# Patient Record
Sex: FEMALE | Race: WHITE | Employment: FULL TIME | URBAN - NONMETROPOLITAN AREA
[De-identification: names, ages, dates, MRNs, and addresses within clinical notes are randomized per-mention and may not be internally consistent; named-entity substitution may affect disease eponyms.]

---

## 2017-01-24 ENCOUNTER — OFFICE VISIT (OUTPATIENT)
Dept: UROLOGY | Age: 37
End: 2017-01-24

## 2017-01-24 VITALS
SYSTOLIC BLOOD PRESSURE: 122 MMHG | BODY MASS INDEX: 35.84 KG/M2 | HEIGHT: 71 IN | WEIGHT: 256 LBS | DIASTOLIC BLOOD PRESSURE: 70 MMHG

## 2017-01-24 DIAGNOSIS — E27.8 ADRENAL NODULE (HCC): ICD-10-CM

## 2017-01-24 DIAGNOSIS — N20.0 KIDNEY STONE: Primary | ICD-10-CM

## 2017-01-24 LAB
BILIRUBIN URINE: NEGATIVE
BLOOD URINE, POC: NORMAL
CHARACTER, URINE: CLEAR
COLOR, URINE: YELLOW
GLUCOSE URINE: NEGATIVE MG/DL
KETONES, URINE: NEGATIVE
LEUKOCYTE CLUMPS, URINE: NEGATIVE
NITRITE, URINE: NEGATIVE
PH, URINE: 5.5
PROTEIN, URINE: NEGATIVE MG/DL
SPECIFIC GRAVITY, URINE: 1.02 (ref 1–1.03)
UROBILINOGEN, URINE: 0.2 EU/DL

## 2017-01-24 PROCEDURE — 99213 OFFICE O/P EST LOW 20 MIN: CPT | Performed by: UROLOGY

## 2017-01-24 PROCEDURE — 81003 URINALYSIS AUTO W/O SCOPE: CPT | Performed by: UROLOGY

## 2017-12-18 ENCOUNTER — TELEPHONE (OUTPATIENT)
Dept: UROLOGY | Age: 37
End: 2017-12-18

## 2017-12-18 DIAGNOSIS — N20.0 KIDNEY STONE: Primary | ICD-10-CM

## 2018-01-30 ENCOUNTER — HOSPITAL ENCOUNTER (OUTPATIENT)
Age: 38
Discharge: HOME | End: 2018-01-30
Payer: COMMERCIAL

## 2018-01-30 ENCOUNTER — TELEPHONE (OUTPATIENT)
Dept: UROLOGY | Age: 38
End: 2018-01-30

## 2018-01-30 ENCOUNTER — HOSPITAL ENCOUNTER (OUTPATIENT)
Dept: GENERAL RADIOLOGY | Age: 38
Discharge: HOME | End: 2018-01-30
Payer: COMMERCIAL

## 2018-01-30 DIAGNOSIS — N20.0 KIDNEY STONE: Primary | ICD-10-CM

## 2018-01-30 DIAGNOSIS — N20.0 KIDNEY STONE: ICD-10-CM

## 2018-01-30 PROCEDURE — 74018 RADEX ABDOMEN 1 VIEW: CPT

## 2018-01-31 ENCOUNTER — OFFICE VISIT (OUTPATIENT)
Dept: UROLOGY | Age: 38
End: 2018-01-31
Payer: COMMERCIAL

## 2018-01-31 VITALS
HEIGHT: 71 IN | WEIGHT: 255 LBS | SYSTOLIC BLOOD PRESSURE: 132 MMHG | DIASTOLIC BLOOD PRESSURE: 70 MMHG | BODY MASS INDEX: 35.7 KG/M2

## 2018-01-31 DIAGNOSIS — N20.0 KIDNEY STONE: Primary | ICD-10-CM

## 2018-01-31 LAB
BILIRUBIN URINE: NEGATIVE
BLOOD URINE, POC: NORMAL
CHARACTER, URINE: CLEAR
COLOR, URINE: YELLOW
GLUCOSE URINE: NEGATIVE MG/DL
KETONES, URINE: NEGATIVE
LEUKOCYTE CLUMPS, URINE: NEGATIVE
NITRITE, URINE: NEGATIVE
PH, URINE: 5
PROTEIN, URINE: NEGATIVE MG/DL
SPECIFIC GRAVITY, URINE: >= 1.03 (ref 1–1.03)
UROBILINOGEN, URINE: 0.2 EU/DL

## 2018-01-31 PROCEDURE — 99213 OFFICE O/P EST LOW 20 MIN: CPT | Performed by: NURSE PRACTITIONER

## 2018-01-31 PROCEDURE — 81003 URINALYSIS AUTO W/O SCOPE: CPT | Performed by: NURSE PRACTITIONER

## 2018-01-31 ASSESSMENT — ENCOUNTER SYMPTOMS
ABDOMINAL PAIN: 0
VOMITING: 0
NAUSEA: 0

## 2018-08-08 ENCOUNTER — TELEPHONE (OUTPATIENT)
Dept: UROLOGY | Age: 38
End: 2018-08-08

## 2018-09-05 ENCOUNTER — TELEPHONE (OUTPATIENT)
Dept: UROLOGY | Age: 38
End: 2018-09-05

## 2018-09-05 NOTE — TELEPHONE ENCOUNTER
Please call patient and let her know that the 24 hour urine study is back. Urine output was good, continue water intake. Calcium is borderline high. Monitor calcium intake, we suggest 800-1200 mg daily. Sodium was high. Start low salt diet, we suggest 1578-5450 mg daily. The oxalate was high and they should start a low oxalate diet. Please mail them a diet sheet. Previous stones were calcium oxalate.

## 2018-09-10 NOTE — TELEPHONE ENCOUNTER
Tried to contact patient and was unable to reach her. Left message for patient to call the office back.

## 2018-09-11 NOTE — TELEPHONE ENCOUNTER
Tried to contact patient to review [facility] results per Ty. Left a voicemail asking for a call back to the office.

## 2019-06-06 DIAGNOSIS — N20.0 KIDNEY STONE: Primary | ICD-10-CM

## 2019-06-11 ENCOUNTER — HOSPITAL ENCOUNTER (OUTPATIENT)
Age: 39
Discharge: HOME | End: 2019-06-11
Payer: COMMERCIAL

## 2019-06-11 ENCOUNTER — HOSPITAL ENCOUNTER (OUTPATIENT)
Dept: GENERAL RADIOLOGY | Age: 39
Discharge: HOME | End: 2019-06-11
Payer: COMMERCIAL

## 2019-06-11 DIAGNOSIS — N20.0 KIDNEY STONE: ICD-10-CM

## 2019-06-11 PROCEDURE — 74018 RADEX ABDOMEN 1 VIEW: CPT

## 2019-06-12 ENCOUNTER — OFFICE VISIT (OUTPATIENT)
Dept: UROLOGY | Age: 39
End: 2019-06-12
Payer: COMMERCIAL

## 2019-06-12 VITALS
WEIGHT: 266 LBS | BODY MASS INDEX: 36.03 KG/M2 | HEIGHT: 72 IN | DIASTOLIC BLOOD PRESSURE: 84 MMHG | SYSTOLIC BLOOD PRESSURE: 122 MMHG

## 2019-06-12 DIAGNOSIS — D35.01 ADENOMA OF RIGHT ADRENAL GLAND: ICD-10-CM

## 2019-06-12 DIAGNOSIS — N20.0 NEPHROLITHIASIS: Primary | ICD-10-CM

## 2019-06-12 LAB
BILIRUBIN URINE: NEGATIVE
BLOOD URINE, POC: ABNORMAL
CHARACTER, URINE: CLEAR
COLOR, URINE: YELLOW
GLUCOSE URINE: NEGATIVE MG/DL
KETONES, URINE: NEGATIVE
LEUKOCYTE CLUMPS, URINE: NEGATIVE
NITRITE, URINE: NEGATIVE
PH, URINE: 5.5 (ref 5–9)
PROTEIN, URINE: NEGATIVE MG/DL
SPECIFIC GRAVITY, URINE: >= 1.03 (ref 1–1.03)
UROBILINOGEN, URINE: 0.2 EU/DL (ref 0–1)

## 2019-06-12 PROCEDURE — 81003 URINALYSIS AUTO W/O SCOPE: CPT | Performed by: NURSE PRACTITIONER

## 2019-06-12 PROCEDURE — 99213 OFFICE O/P EST LOW 20 MIN: CPT | Performed by: NURSE PRACTITIONER

## 2019-06-12 RX ORDER — LORATADINE 10 MG/1
10 TABLET, ORALLY DISINTEGRATING ORAL DAILY
COMMUNITY
End: 2021-05-19 | Stop reason: ALTCHOICE

## 2019-06-12 RX ORDER — FLUTICASONE PROPIONATE 50 MCG
1 SPRAY, SUSPENSION (ML) NASAL DAILY
COMMUNITY

## 2019-06-12 RX ORDER — CITALOPRAM 20 MG/1
20 TABLET ORAL DAILY
COMMUNITY
End: 2021-05-19

## 2019-06-12 ASSESSMENT — ENCOUNTER SYMPTOMS
ABDOMINAL PAIN: 0
NAUSEA: 0
VOMITING: 0

## 2019-06-12 NOTE — PROGRESS NOTES
Subjective:      Patient ID: Napoleon Ramos is a 45 y.o. female. KIRBY Richmond is here for follow-up of kidney stones. She underwent left ESWL in June 2016 and again in July 2016 for large left renal stone. She passed several stone fragments following surgery. She is currently asymptomatic. She is here to review her Xray KUB. She also has a right adrenal nodule which is consistent with adrenal adenoma. Review of Systems   Constitutional: Negative for chills, fatigue and fever. Gastrointestinal: Negative for abdominal pain, nausea and vomiting. Genitourinary: Negative for dysuria, flank pain, frequency, hematuria and urgency. Objective:   Physical Exam   Constitutional: She is oriented to person, place, and time. She appears well-developed and well-nourished. HENT:   Head: Normocephalic and atraumatic. Right Ear: External ear normal.   Left Ear: External ear normal.   Nose: Nose normal.   Eyes: Conjunctivae are normal.   Pulmonary/Chest: Effort normal.   Abdominal: Soft. Musculoskeletal: Normal range of motion. Neurological: She is alert and oriented to person, place, and time. Skin: Skin is warm and dry. Psychiatric: She has a normal mood and affect. Her behavior is normal. Judgment and thought content normal.         Assessment:      Left renal Calculi--stable. Right adrenal adenoma      Plan:      Patient given order for 24 hour urine study. We will call with results. Recent CT abd/pelvis shows right adrenal nodule is consistent with adenoma. Follow-up in 1 year with KUB prior.

## 2019-07-09 ENCOUNTER — TELEPHONE (OUTPATIENT)
Dept: UROLOGY | Age: 39
End: 2019-07-09

## 2019-07-09 ENCOUNTER — HOSPITAL ENCOUNTER (OUTPATIENT)
Dept: CT IMAGING | Age: 39
Discharge: HOME | End: 2019-07-09
Payer: COMMERCIAL

## 2019-07-09 DIAGNOSIS — Z00.6 ENCOUNTER FOR EXAMINATION FOR NORMAL COMPARISON AND CONTROL IN CLINICAL RESEARCH PROGRAM: ICD-10-CM

## 2019-07-09 PROCEDURE — 3209999900 CT COMPARISON OF OUTSIDE FILMS

## 2019-07-18 ASSESSMENT — ENCOUNTER SYMPTOMS
VOMITING: 0
ABDOMINAL PAIN: 0
NAUSEA: 0

## 2019-07-18 NOTE — PROGRESS NOTES
Subjective:      Patient ID: Rosetta Kaba is a 45 y.o. female. KIRBY Fang is here for follow-up of kidney stones, right adrenal adenoma, and recent ED visit. She recently presented to [facility] ED on 7/8/19 due to acute onset of severe left flank pain which radiated down to her bladder. She reports the pain has improved but is still noticeable. CT abd/pelvis showed a left renal stone and persistent left hydronephrosis. She underwent left ESWL in June 2016 and again in July 2016 for large left renal stone. She passed several stone fragments following surgery. She also has a right adrenal nodule which is consistent with adrenal adenoma. Review of Systems   Constitutional: Negative for chills, fatigue and fever. Gastrointestinal: Negative for abdominal pain, nausea and vomiting. Genitourinary: Negative for dysuria, flank pain, frequency, hematuria and urgency. Objective:   Physical Exam   Constitutional: She is oriented to person, place, and time. She appears well-developed and well-nourished. HENT:   Head: Normocephalic and atraumatic. Right Ear: External ear normal.   Left Ear: External ear normal.   Nose: Nose normal.   Eyes: Conjunctivae are normal.   Pulmonary/Chest: Effort normal.   Abdominal: Soft. Musculoskeletal: Normal range of motion. Neurological: She is alert and oriented to person, place, and time. Skin: Skin is warm and dry. Psychiatric: She has a normal mood and affect.  Her behavior is normal. Judgment and thought content normal.     Results for POC orders placed in visit on 07/19/19   POCT Urinalysis No Micro (Auto)   Result Value Ref Range    Glucose, Ur Negative NEGATIVE mg/dl    Bilirubin Urine Negative     Ketones, Urine Negative NEGATIVE    Specific Gravity, Urine >= 1.030 1.002 - 1.03    Blood, UA POC Negative NEGATIVE    pH, Urine 5.50 5.0 - 9.0    Protein, Urine Negative NEGATIVE mg/dl    Urobilinogen, Urine 0.20 0.0 - 1.0 eu/dl    Nitrite, Urine Negative

## 2019-07-19 ENCOUNTER — OFFICE VISIT (OUTPATIENT)
Dept: UROLOGY | Age: 39
End: 2019-07-19
Payer: COMMERCIAL

## 2019-07-19 VITALS
WEIGHT: 276 LBS | BODY MASS INDEX: 37.38 KG/M2 | DIASTOLIC BLOOD PRESSURE: 86 MMHG | SYSTOLIC BLOOD PRESSURE: 110 MMHG | HEIGHT: 72 IN

## 2019-07-19 DIAGNOSIS — N13.39 OTHER HYDRONEPHROSIS: ICD-10-CM

## 2019-07-19 DIAGNOSIS — N20.0 KIDNEY STONE: ICD-10-CM

## 2019-07-19 DIAGNOSIS — N39.0 RECURRENT UTI: Primary | ICD-10-CM

## 2019-07-19 LAB
BILIRUBIN URINE: NEGATIVE
BLOOD URINE, POC: NEGATIVE
CHARACTER, URINE: CLEAR
COLOR, URINE: YELLOW
GLUCOSE URINE: NEGATIVE MG/DL
KETONES, URINE: NEGATIVE
LEUKOCYTE CLUMPS, URINE: NEGATIVE
NITRITE, URINE: NEGATIVE
PH, URINE: 5.5 (ref 5–9)
PROTEIN, URINE: NEGATIVE MG/DL
SPECIFIC GRAVITY, URINE: >= 1.03 (ref 1–1.03)
UROBILINOGEN, URINE: 0.2 EU/DL (ref 0–1)

## 2019-07-19 PROCEDURE — 81003 URINALYSIS AUTO W/O SCOPE: CPT | Performed by: NURSE PRACTITIONER

## 2019-07-19 PROCEDURE — 99214 OFFICE O/P EST MOD 30 MIN: CPT | Performed by: NURSE PRACTITIONER

## 2019-07-19 RX ORDER — SULFAMETHOXAZOLE AND TRIMETHOPRIM 800; 160 MG/1; MG/1
TABLET ORAL
Refills: 0 | COMMUNITY
Start: 2019-07-14 | End: 2021-05-19

## 2019-07-29 DIAGNOSIS — N20.0 KIDNEY STONE: ICD-10-CM

## 2019-07-29 DIAGNOSIS — N39.0 RECURRENT UTI: ICD-10-CM

## 2019-07-29 DIAGNOSIS — N13.39 OTHER HYDRONEPHROSIS: Primary | ICD-10-CM

## 2019-07-30 ENCOUNTER — TELEPHONE (OUTPATIENT)
Dept: UROLOGY | Age: 39
End: 2019-07-30

## 2019-07-30 ENCOUNTER — HOSPITAL ENCOUNTER (OUTPATIENT)
Dept: NUCLEAR MEDICINE | Age: 39
Discharge: HOME | End: 2019-07-30
Payer: COMMERCIAL

## 2019-07-30 DIAGNOSIS — N20.0 KIDNEY STONE: ICD-10-CM

## 2019-07-30 DIAGNOSIS — N13.39 OTHER HYDRONEPHROSIS: ICD-10-CM

## 2019-07-30 DIAGNOSIS — N39.0 RECURRENT UTI: ICD-10-CM

## 2019-07-30 LAB — POC CREATININE WHOLE BLOOD: 0.6 MG/DL (ref 0.5–1.2)

## 2019-07-30 PROCEDURE — 82565 ASSAY OF CREATININE: CPT

## 2019-07-30 PROCEDURE — 6360000002 HC RX W HCPCS

## 2019-07-30 PROCEDURE — 78708 K FLOW/FUNCT IMAGE W/DRUG: CPT

## 2019-07-30 PROCEDURE — 2709999900 HC NON-CHARGEABLE SUPPLY

## 2019-07-30 PROCEDURE — 3430000000 HC RX DIAGNOSTIC RADIOPHARMACEUTICAL: Performed by: NURSE PRACTITIONER

## 2019-07-30 PROCEDURE — A9562 TC99M MERTIATIDE: HCPCS | Performed by: NURSE PRACTITIONER

## 2019-07-30 RX ORDER — FUROSEMIDE 10 MG/ML
40 INJECTION INTRAMUSCULAR; INTRAVENOUS ONCE
Status: COMPLETED | OUTPATIENT
Start: 2019-07-30 | End: 2019-07-30

## 2019-07-30 RX ADMIN — FUROSEMIDE 40 MG: 10 INJECTION INTRAMUSCULAR; INTRAVENOUS at 07:38

## 2019-07-30 RX ADMIN — Medication 10 MILLICURIE: at 07:28

## 2021-05-11 DIAGNOSIS — N20.0 KIDNEY STONE: Primary | ICD-10-CM

## 2021-05-17 ENCOUNTER — HOSPITAL ENCOUNTER (OUTPATIENT)
Dept: GENERAL RADIOLOGY | Age: 41
Discharge: HOME | End: 2021-05-17
Payer: COMMERCIAL

## 2021-05-17 ENCOUNTER — HOSPITAL ENCOUNTER (OUTPATIENT)
Age: 41
Discharge: HOME | End: 2021-05-17
Payer: COMMERCIAL

## 2021-05-17 DIAGNOSIS — N20.0 KIDNEY STONE: ICD-10-CM

## 2021-05-17 PROCEDURE — 74018 RADEX ABDOMEN 1 VIEW: CPT

## 2021-05-19 ENCOUNTER — OFFICE VISIT (OUTPATIENT)
Dept: UROLOGY | Age: 41
End: 2021-05-19
Payer: COMMERCIAL

## 2021-05-19 VITALS — WEIGHT: 257 LBS | BODY MASS INDEX: 34.81 KG/M2 | HEIGHT: 72 IN

## 2021-05-19 DIAGNOSIS — R31.29 MICROHEMATURIA: ICD-10-CM

## 2021-05-19 DIAGNOSIS — N20.0 KIDNEY STONE: Primary | ICD-10-CM

## 2021-05-19 LAB
BACTERIA: ABNORMAL
BILIRUBIN URINE: NEGATIVE
BILIRUBIN URINE: NEGATIVE
BLOOD URINE, POC: ABNORMAL
BLOOD, URINE: ABNORMAL
CASTS: ABNORMAL /LPF
CASTS: ABNORMAL /LPF
CHARACTER, URINE: CLEAR
CHARACTER, URINE: CLEAR
COLOR, URINE: YELLOW
COLOR: YELLOW
CRYSTALS: ABNORMAL
EPITHELIAL CELLS, UA: ABNORMAL /HPF
GLUCOSE URINE: NEGATIVE MG/DL
GLUCOSE, URINE: NEGATIVE MG/DL
KETONES, URINE: NEGATIVE
KETONES, URINE: NEGATIVE
LEUKOCYTE CLUMPS, URINE: NEGATIVE
LEUKOCYTE ESTERASE, URINE: NEGATIVE
MISCELLANEOUS LAB TEST RESULT: ABNORMAL
NITRITE, URINE: NEGATIVE
NITRITE, URINE: NEGATIVE
PH UA: 5 (ref 5–9)
PH, URINE: 5.5 (ref 5–9)
PROTEIN UA: ABNORMAL MG/DL
PROTEIN, URINE: ABNORMAL MG/DL
RBC URINE: ABNORMAL /HPF
RENAL EPITHELIAL, UA: ABNORMAL
SPECIFIC GRAVITY UA: 1.02 (ref 1–1.03)
SPECIFIC GRAVITY, URINE: 1.02 (ref 1–1.03)
UROBILINOGEN, URINE: 0.2 EU/DL (ref 0–1)
UROBILINOGEN, URINE: 0.2 EU/DL (ref 0–1)
WBC UA: ABNORMAL /HPF
YEAST: ABNORMAL

## 2021-05-19 PROCEDURE — 81003 URINALYSIS AUTO W/O SCOPE: CPT | Performed by: NURSE PRACTITIONER

## 2021-05-19 PROCEDURE — 99214 OFFICE O/P EST MOD 30 MIN: CPT | Performed by: NURSE PRACTITIONER

## 2021-05-19 RX ORDER — LISINOPRIL 20 MG/1
20 TABLET ORAL DAILY
COMMUNITY
End: 2022-02-25 | Stop reason: SDUPTHER

## 2021-05-19 RX ORDER — CYCLOBENZAPRINE HCL 10 MG
10 TABLET ORAL NIGHTLY PRN
COMMUNITY
End: 2021-12-14

## 2021-05-19 ASSESSMENT — ENCOUNTER SYMPTOMS
ABDOMINAL PAIN: 0
NAUSEA: 0
VOMITING: 0

## 2021-05-19 NOTE — PROGRESS NOTES
Subjective:      Patient ID: Arnetta Apgar is a 36 y.o. female. HPI      Zandra Madrid is here for follow-up of kidney stones, right adrenal adenoma. Patient previously seen by Kalie Rainey, and Dr. Ramon Hartley. Last seen 2 years ago. KUB shows left renal stones. Reports intermittent lower back pain for last few months. Treated for UTI a few months ago. No symptoms today. No gross hematuria  + hx of tobacco use 21 years. CT in 2019 shows left hydronephrosis, follow up renal lasix scan showed no high grade obstruction. In the past she underwent left ESWL in 2016 and again in 2016 for large left renal stone. She passed several stone fragments following surgery. She also has a right adrenal nodule which is consistent with adrenal adenoma. Review of Systems   Constitutional: Negative for chills, fatigue and fever. Gastrointestinal: Negative for abdominal pain, nausea and vomiting. Genitourinary: Negative for dysuria, flank pain, frequency, hematuria and urgency. Objective:   Physical Exam  Constitutional:       Appearance: She is well-developed. HENT:      Head: Normocephalic and atraumatic. Right Ear: External ear normal.      Left Ear: External ear normal.      Nose: Nose normal.   Eyes:      Conjunctiva/sclera: Conjunctivae normal.   Pulmonary:      Effort: Pulmonary effort is normal.   Abdominal:      Palpations: Abdomen is soft. Musculoskeletal:         General: Normal range of motion. Skin:     General: Skin is warm and dry. Neurological:      Mental Status: She is alert and oriented to person, place, and time. Psychiatric:         Behavior: Behavior normal.         Thought Content:  Thought content normal.         Judgment: Judgment normal.       Results for POC orders placed in visit on 21   POCT Urinalysis No Micro (Auto)   Result Value Ref Range    Glucose, Ur Negative NEGATIVE mg/dl    Bilirubin Urine Negative     Ketones, Urine Negative NEGATIVE Specific Gravity, Urine 1.025 1.002 - 1.030    Blood, UA POC Large (A) NEGATIVE    pH, Urine 5.50 5.0 - 9.0    Protein, Urine Trace (A) NEGATIVE mg/dl    Urobilinogen, Urine 0.20 0.0 - 1.0 eu/dl    Nitrite, Urine Negative NEGATIVE    Leukocyte Clumps, Urine Negative NEGATIVE    Color, Urine Yellow YELLOW-STRAW    Character, Urine Clear CLR-SL.CLOUD       KUB:       TECHNIQUE:  AP supine abdomen 2 views        FINDINGS:        There is a cluster of rounded calcified densities overlying the lower pole the right kidney. In aggregate, these measure approximately 1.4 x 1 cm. The largest of these calcified densities measures approximately 7 x 6 mm in dimension. These overlie the    expected location of the left lower pole renal shadow.       There is no evidence of bowel obstruction. Surgical clips are seen overlying the right upper quadrant from prior cholecystectomy. There are calcified phleboliths overlying the pelvis.           Impression   1. There is a cluster of several rounded calcified densities overlying the lower pole the right kidney. In aggregate, these measure approximately 1.4 x 1 cm. The largest of these calcified densities measures approximately 7 x 6 mm in dimension. These    overlie the expected location of the left lower pole renal shadow.               Assessment:      Left renal Calculi--stable. Right adrenal adenoma  Lower back pain  Hx of tobacco use  Left hydronephrosis  Microscopic hematuria      Plan:      Send urine today for micro, culture  CT Urogram  FU with physician to discuss CT, possible cysto in office.

## 2021-05-20 ENCOUNTER — TELEPHONE (OUTPATIENT)
Dept: UROLOGY | Age: 41
End: 2021-05-20

## 2021-05-20 NOTE — TELEPHONE ENCOUNTER
----- Message from KOJO Ferro CNP sent at 5/20/2021  9:20 AM EDT -----  Urine did show blood microscopically.  Keep appt for ct and cystoscopy

## 2021-05-20 NOTE — TELEPHONE ENCOUNTER
Patient advised micro urine was positive for blood. She voiced understanding to keep appointment for ct scan and cystoscopy.

## 2021-05-21 LAB
ORGANISM: ABNORMAL
URINE CULTURE, ROUTINE: ABNORMAL

## 2021-06-02 ENCOUNTER — HOSPITAL ENCOUNTER (OUTPATIENT)
Dept: CT IMAGING | Age: 41
Discharge: HOME | End: 2021-06-02
Payer: COMMERCIAL

## 2021-06-02 DIAGNOSIS — N20.0 KIDNEY STONE: ICD-10-CM

## 2021-06-02 DIAGNOSIS — R31.29 MICROHEMATURIA: ICD-10-CM

## 2021-06-02 PROCEDURE — 6360000004 HC RX CONTRAST MEDICATION: Performed by: NURSE PRACTITIONER

## 2021-06-02 PROCEDURE — 74178 CT ABD&PLV WO CNTR FLWD CNTR: CPT

## 2021-06-02 RX ADMIN — IOPAMIDOL 80 ML: 755 INJECTION, SOLUTION INTRAVENOUS at 17:07

## 2021-06-16 ENCOUNTER — PROCEDURE VISIT (OUTPATIENT)
Dept: UROLOGY | Age: 41
End: 2021-06-16
Payer: COMMERCIAL

## 2021-06-16 VITALS — WEIGHT: 254 LBS | BODY MASS INDEX: 34.4 KG/M2 | HEIGHT: 72 IN

## 2021-06-16 DIAGNOSIS — N20.0 STAGHORN CALCULUS: Primary | ICD-10-CM

## 2021-06-16 DIAGNOSIS — R31.29 MICROHEMATURIA: ICD-10-CM

## 2021-06-16 LAB
BILIRUBIN URINE: NEGATIVE
BLOOD URINE, POC: ABNORMAL
CHARACTER, URINE: CLEAR
COLOR, URINE: YELLOW
GLUCOSE URINE: NEGATIVE MG/DL
KETONES, URINE: NEGATIVE
LEUKOCYTE CLUMPS, URINE: NEGATIVE
NITRITE, URINE: NEGATIVE
PH, URINE: 5 (ref 5–9)
PROTEIN, URINE: NEGATIVE MG/DL
SPECIFIC GRAVITY, URINE: 1.02 (ref 1–1.03)
UROBILINOGEN, URINE: 0.2 EU/DL (ref 0–1)

## 2021-06-16 PROCEDURE — 81003 URINALYSIS AUTO W/O SCOPE: CPT | Performed by: UROLOGY

## 2021-06-16 PROCEDURE — 99214 OFFICE O/P EST MOD 30 MIN: CPT | Performed by: UROLOGY

## 2021-06-16 NOTE — PROGRESS NOTES
[facility] [id number]  Dept: [phone]  Dept Fax: [phone] : [address] [facility] Jeannette Savage, [address] [facility] Urology Office Note     Patient:  Soniya Arias  YOB: 1980      The patient is a 36 y.o. female who presents today for evaluation of the following problems:   Chief Complaint   Patient presents with    Follow-up     micro hematuria hx kidney stones-ct prior-poss scope today         HISTORY OF PRESENT ILLNESS:     Microhematuria- new problem no gross hematuria no smoking hx    Kidney stones- reviewed films with patient. Pt has had multiple stone surgeries in past. Was told her stones were in locations not amenable to surgical removal in past    Summary of Previous Records:   [facility] is here for follow-up of kidney stones, right adrenal adenoma.     Patient previously seen by Evon Boo, and Dr. Dick Schuler. Last seen 2 years ago. KUB shows left renal stones.     Reports intermittent lower back pain for last few months. Treated for UTI a few months ago. No symptoms today. No gross hematuria  + hx of tobacco use 21 years. CT in 2019 shows left hydronephrosis, follow up renal lasix scan showed no high grade obstruction.                  In the past she underwent left ESWL in June 2016 and again in July 2016 for large left renal stone. She passed several stone fragments following surgery. She also has a right adrenal nodule which is consistent with adrenal adenoma.       Requested/reviewed records from [facility].  Elvia Fraga, KOJO - CNP office and/or outside [de-identified]    (Patient's old records have been requested, reviewed and pertinent findings summarized in today's note.)    Procedures Today: N/A    Last several PSA's:  No results found for: PSA    Last total testosterone:  No results found for: TESTOSTERONE    Urinalysis today:  Results for POC orders placed in visit on ureterolithiasis is seen. The urinary bladder appears normal. There is no evidence of bowel obstruction. There is no lymphadenopathy. There is no free air or free fluid. No acute osseous findings are seen. There is degenerative disc disease at L5-S1 with disc space during, hypertrophic endplate change and endplate sclerosis. 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 1. Nonobstructive 1.2 x 0.9 cm staghorn calculus within the lower pole the left kidney. No hydronephrosis, hydroureter or ureterolithiasis is seen. **This report has been created using voice recognition software. It may contain minor errors which are inherent in voice recognition technology. ** Final report electronically signed by Dr. Gina Brooks on 6/3/2021 10:12 AM      PAST MEDICAL, FAMILY AND SOCIAL HISTORY:  Past Medical History:   Diagnosis Date    Kidney stone     2016    PONV (postoperative nausea and vomiting)      Past Surgical History:   Procedure Laterality Date    BONE MARROW BIOPSY  2017     SECTION      x2    CHOLECYSTECTOMY      CYSTOSCOPY  2016    Dr Diony Bansal LITHOTRIPSY Left 2016    Dr Saenz Aburto HISTORY  2016    LEFT ESWL    TUBAL LIGATION      URETER STENT PLACEMENT Left 2016    Dr Salena Muhammad     Family History   Problem Relation Age of Onset    Cancer Mother         breast, cervical    Heart Disease Mother     Diabetes Mother     Kidney Disease Father         dialysis    High Blood Pressure Father      Outpatient Medications Marked as Taking for the 21 encounter (Procedure visit) with Claudio Chavez MD   Medication Sig Dispense Refill    metFORMIN (GLUCOPHAGE) 500 MG tablet Take 500 mg by mouth daily      cyclobenzaprine (FLEXERIL) 10 MG tablet Take 10 mg by mouth nightly as needed for Muscle spasms      lisinopril (PRINIVIL;ZESTRIL) 20 MG tablet Take 20 mg by mouth daily      Fexofenadine HCl (ALLEGRA PO) Take by mouth as needed      fluticasone (FLONASE) 50 MCG/ACT nasal spray 1 spray by Each Nostril route daily         Codeine and Morphine  Social History     Tobacco Use   Smoking Status Former Smoker    Types: Cigarettes    Quit date: 1/1/2018    Years since quitting: 3.6   Smokeless Tobacco Never Used   Tobacco Comment    About a pack/week      (If patient a smoker, smoking cessation counseling offered)   Social History     Substance and Sexual Activity   Alcohol Use No       REVIEW OF SYSTEMS:  Constitutional: negative  Eyes: negative  Respiratory: negative  Cardiovascular: negative  Gastrointestinal: negative  Genitourinary: see HPI  Musculoskeletal: negative  Skin: negative   Neurological: negative  Hematological/Lymphatic: negative  Psychological: negative      Physical Exam:    This a 36 y.o. female  There were no vitals filed for this visit. Body mass index is 34.45 kg/m². Constitutional: Patient in no acute distress;         Assessment and Plan        1. Staghorn calculus    2. Microhematuria               Plan:      Kidney stones- reviewed ct. Discussed options including surgery. Pt elects to monitor with KUB. Microhematuria- defer workup. Will follow     KUB and UA in six months        Prescriptions Ordered:  No orders of the defined types were placed in this encounter.      Orders Placed:  Orders Placed This Encounter   Procedures    XR ABDOMEN (KUB) (SINGLE AP VIEW)     Standing Status:   Future     Standing Expiration Date:   6/16/2022    POCT Urinalysis No Micro (Auto)            ROHITH Manzanares MD

## 2021-12-09 ENCOUNTER — HOSPITAL ENCOUNTER (OUTPATIENT)
Age: 41
Discharge: HOME | End: 2021-12-09
Payer: COMMERCIAL

## 2021-12-09 ENCOUNTER — HOSPITAL ENCOUNTER (OUTPATIENT)
Dept: GENERAL RADIOLOGY | Age: 41
Discharge: HOME | End: 2021-12-09
Payer: COMMERCIAL

## 2021-12-09 DIAGNOSIS — N20.0 STAGHORN CALCULUS: ICD-10-CM

## 2021-12-09 PROCEDURE — 74018 RADEX ABDOMEN 1 VIEW: CPT

## 2021-12-14 ENCOUNTER — OFFICE VISIT (OUTPATIENT)
Dept: UROLOGY | Age: 41
End: 2021-12-14
Payer: COMMERCIAL

## 2021-12-14 VITALS
SYSTOLIC BLOOD PRESSURE: 110 MMHG | WEIGHT: 255 LBS | DIASTOLIC BLOOD PRESSURE: 68 MMHG | HEIGHT: 72 IN | BODY MASS INDEX: 34.54 KG/M2

## 2021-12-14 DIAGNOSIS — R31.29 MICROHEMATURIA: Primary | ICD-10-CM

## 2021-12-14 DIAGNOSIS — N20.0 STAGHORN CALCULUS: ICD-10-CM

## 2021-12-14 LAB
BILIRUBIN URINE: NEGATIVE
BLOOD URINE, POC: NORMAL
CHARACTER, URINE: CLEAR
COLOR, URINE: YELLOW
GLUCOSE URINE: NEGATIVE MG/DL
KETONES, URINE: NEGATIVE
LEUKOCYTE CLUMPS, URINE: NEGATIVE
NITRITE, URINE: NEGATIVE
PH, URINE: 5.5 (ref 5–9)
PROTEIN, URINE: NEGATIVE MG/DL
SPECIFIC GRAVITY, URINE: >= 1.03 (ref 1–1.03)
UROBILINOGEN, URINE: 0.2 EU/DL (ref 0–1)

## 2021-12-14 PROCEDURE — 99214 OFFICE O/P EST MOD 30 MIN: CPT | Performed by: UROLOGY

## 2021-12-14 PROCEDURE — 81003 URINALYSIS AUTO W/O SCOPE: CPT | Performed by: UROLOGY

## 2021-12-14 NOTE — PROGRESS NOTES
[address] [id number]  Dept: [phone]  Dept Fax: [phone]: [address] Kervin Nunezyra, [address] [place] [address] Urology Office Note     Patient:  Millie Irving  YOB: 1980      The patient is a 39 y.o. female who presents today for evaluation of the following problems:   Chief Complaint   Patient presents with    Follow-up     kub prior    Nephrolithiasis     Staghorn calculus     Hematuria        HISTORY OF PRESENT ILLNESS:     Microhematuria-  no gross hematuria no smoking hx. Deferred previous workup    Kidney stones- reviewed films with patient. Pt has had multiple stone surgeries in past. Was told her stones were in locations not amenable to surgical removal in past. Stable on KUB. Summary of Previous Records:   Jackie Ramirez is here for follow-up of kidney stones, right adrenal adenoma.     Patient previously seen by Cloria Goltz, and Dr. Lucila Schumacher. Last seen 2 years ago. KUB shows left renal stones.     Reports intermittent lower back pain for last few months. Treated for UTI a few months ago. No symptoms today. No gross hematuria  + hx of tobacco use 21 years. CT in 2019 shows left hydronephrosis, follow up renal lasix scan showed no high grade obstruction.                  In the past she underwent left ESWL in June 2016 and again in July 2016 for large left renal stone. She passed several stone fragments following surgery. She also has a right adrenal nodule which is consistent with adrenal adenoma.       Requested/reviewed records from [address]".  KOJO Barksdale - CNP office and/or outside [de-identified]    (Patient's old records have been requested, reviewed and pertinent findings summarized in today's note.)    Procedures Today: N/A    Last several PSA's:  No results found for: PSA    Last total testosterone:  No results found for: TESTOSTERONE    Urinalysis today:  Results for POC orders placed in visit on 12/14/21   POCT Urinalysis No Micro (Auto)   Result Value Ref Range    Glucose, Ur Negative NEGATIVE mg/dl    Bilirubin Urine Negative     Ketones, Urine Negative NEGATIVE    Specific Gravity, Urine >= 1.030 1.002 - 1.030    Blood, UA POC Trace-intact NEGATIVE    pH, Urine 5.50 5.0 - 9.0    Protein, Urine Negative NEGATIVE mg/dl    Urobilinogen, Urine 0.20 0.0 - 1.0 eu/dl    Nitrite, Urine Negative NEGATIVE    Leukocyte Clumps, Urine Negative NEGATIVE    Color, Urine Yellow YELLOW-STRAW    Character, Urine Clear CLR-SL.CLOUD       Last BUN and creatinine:  Lab Results   Component Value Date    BUN 11 06/17/2016     Lab Results   Component Value Date    CREATININE 0.7 06/17/2016       Imaging Reviewed during this Office Visit:   Shanel Vargas MD independently reviewed the images and verified the radiology reports from:    imaging independently reviewed by Shanel Vargas MD and radiology report verified demonstrating     XR ABDOMEN (KUB) (SINGLE AP VIEW)    Result Date: 12/9/2021  PROCEDURE: XR ABDOMEN (KUB) (SINGLE AP VIEW) CLINICAL INFORMATION: Staghorn calculus COMPARISON: 5/17/2021 TECHNIQUE: 2 supine images of the abdomen were obtained. FINDINGS: No abnormally dilated bowel loops are seen. Moderate amount of fecal material in colon, consistent with constipation. Evidence for prior cholecystectomy. There are for clustered calculi in the lower pole calyx of left kidney, largest measuring 7 mm. These are unchanged from prior study. There are a few phleboliths in the pelvis. Constipation. Renal calculi lower pole left kidney, unchanged from prior study. **This report has been created using voice recognition software. It may contain minor errors which are inherent in voice recognition technology. ** Final report electronically signed by Dr. Imelda Feliz on 12/9/2021 9:09 AM        CT UROGRAM    Result Date: 6/3/2021  PROCEDURE: CT UROGRAM CLINICAL INFORMATION: Kidney stone, Microhematuria COMPARISON: 6/2/2016 TECHNIQUE: Axial CT images were obtained through the abdomen and pelvis without contrast. Postcontrast images were obtained through the abdomen with 2 minute delay and through the abdomen and pelvis with  8 minute delay. Coronal and sagittal reformatted images were rendered. All CT scans at this facility use dose modulation, iterative reconstruction, and/or weight-based dosing when appropriate to reduce radiation dose to as low as reasonably achievable. FINDINGS: Limited evaluation of the lung bases appear unremarkable. The liver, spleen, and pancreas appear normal. There is a low-attenuation 1.2 cm lesion within the right adrenal gland which likely represents an adrenal adenoma. Previously this measured 1 cm in transverse dimension on axial image 6/2/2016. The gallbladder surgically absent. There is a coarse staghorn calculus within the lower pole the left kidney measuring approximately 1.2 x 0.9 cm. This is nonobstructive. No hydronephrosis or hydroureter is seen. No ureterolithiasis is seen. The urinary bladder appears normal. There is no evidence of bowel obstruction. There is no lymphadenopathy. There is no free air or free fluid. No acute osseous findings are seen. There is degenerative disc disease at L5-S1 with disc space during, hypertrophic endplate change and endplate sclerosis. 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 1. Nonobstructive 1.2 x 0.9 cm staghorn calculus within the lower pole the left kidney. No hydronephrosis, hydroureter or ureterolithiasis is seen. **This report has been created using voice recognition software. It may contain minor errors which are inherent in voice recognition technology. ** Final report electronically signed by Dr. Vy Glass on 6/3/2021 10:12 AM      PAST MEDICAL, FAMILY AND SOCIAL HISTORY:  Past Medical History:   Diagnosis Date    Kidney stone     6/2016    PONV (postoperative nausea and vomiting)      Past Surgical History:   Procedure Laterality Date    BONE MARROW BIOPSY  2017     SECTION      x2    CHOLECYSTECTOMY      CYSTOSCOPY  2016    Dr Yajaira Barrett LITHOTRIPSY Left 2016    Dr Destiney Moreno  2016    LEFT ESWL    TUBAL LIGATION      URETER STENT PLACEMENT Left 2016    Dr Samantha Mitchell     Family History   Problem Relation Age of Onset    Cancer Mother         breast, cervical    Heart Disease Mother     Diabetes Mother     Kidney Disease Father         dialysis    High Blood Pressure Father      Outpatient Medications Marked as Taking for the 21 encounter (Office Visit) with Eddie Santiago MD   Medication Sig Dispense Refill    lisinopril (PRINIVIL;ZESTRIL) 20 MG tablet Take 20 mg by mouth daily      Fexofenadine HCl (ALLEGRA PO) Take by mouth as needed      fluticasone (FLONASE) 50 MCG/ACT nasal spray 1 spray by Each Nostril route daily         Codeine and Morphine  Social History     Tobacco Use   Smoking Status Former Smoker    Types: Cigarettes    Quit date: 2018    Years since quitting: 3.9   Smokeless Tobacco Never Used   Tobacco Comment    About a pack/week      (If patient a smoker, smoking cessation counseling offered)   Social History     Substance and Sexual Activity   Alcohol Use No       REVIEW OF SYSTEMS:  Constitutional: negative  Eyes: negative  Respiratory: negative  Cardiovascular: negative  Gastrointestinal: negative  Genitourinary: see HPI  Musculoskeletal: negative  Skin: negative   Neurological: negative  Hematological/Lymphatic: negative  Psychological: negative      Physical Exam:    This a 39 y.o. female  Vitals:    21 1609   BP: 110/68     Body mass index is 34.58 kg/m². Constitutional: Patient in no acute distress;         Assessment and Plan        1. Microhematuria    2. Staghorn calculus               Plan:      Kidney stones- stable. reviewed kub. Jesus Copheidy Discussed options including surgery.  Pt elects to monitor with

## 2022-01-10 ENCOUNTER — TELEPHONE (OUTPATIENT)
Dept: FAMILY MEDICINE CLINIC | Age: 42
End: 2022-01-10

## 2022-01-13 ENCOUNTER — HOSPITAL ENCOUNTER (EMERGENCY)
Age: 42
Discharge: HOME | End: 2022-01-13
Payer: COMMERCIAL

## 2022-01-13 VITALS
DIASTOLIC BLOOD PRESSURE: 65 MMHG | TEMPERATURE: 98.3 F | RESPIRATION RATE: 16 BRPM | HEIGHT: 72 IN | SYSTOLIC BLOOD PRESSURE: 135 MMHG | OXYGEN SATURATION: 100 % | HEART RATE: 70 BPM | WEIGHT: 256 LBS | BODY MASS INDEX: 34.67 KG/M2

## 2022-01-13 DIAGNOSIS — J01.00 ACUTE MAXILLARY SINUSITIS, RECURRENCE NOT SPECIFIED: Primary | ICD-10-CM

## 2022-01-13 PROCEDURE — 99203 OFFICE O/P NEW LOW 30 MIN: CPT | Performed by: NURSE PRACTITIONER

## 2022-01-13 PROCEDURE — 99213 OFFICE O/P EST LOW 20 MIN: CPT

## 2022-01-13 RX ORDER — DOXYCYCLINE HYCLATE 100 MG/1
100 CAPSULE ORAL 2 TIMES DAILY
Qty: 20 CAPSULE | Refills: 0 | Status: SHIPPED | OUTPATIENT
Start: 2022-01-13 | End: 2022-01-23

## 2022-01-13 RX ORDER — BENZONATATE 200 MG/1
200 CAPSULE ORAL 3 TIMES DAILY PRN
Qty: 30 CAPSULE | Refills: 0 | Status: SHIPPED | OUTPATIENT
Start: 2022-01-13 | End: 2022-01-20

## 2022-01-13 RX ORDER — PREDNISONE 20 MG/1
20 TABLET ORAL 2 TIMES DAILY
Qty: 10 TABLET | Refills: 0 | Status: SHIPPED | OUTPATIENT
Start: 2022-01-13 | End: 2022-01-18

## 2022-01-13 ASSESSMENT — ENCOUNTER SYMPTOMS
VOMITING: 0
RHINORRHEA: 1
SINUS PRESSURE: 1
CHEST TIGHTNESS: 1
DIARRHEA: 0
SHORTNESS OF BREATH: 0
NAUSEA: 0
COUGH: 1
SORE THROAT: 1

## 2022-01-13 NOTE — ED PROVIDER NOTES
[facility] 36  Urgent Care Encounter       CHIEF COMPLAINT       Chief Complaint   Patient presents with    Nasal Congestion    Facial Pain       Nurses Notes reviewed and I agree except as noted in the HPI. HISTORY OF PRESENT ILLNESS   Suki Palomares is a 39 y.o. female who presents to the [facility] urgent care for evaluation of sinus issues. She reports that she was treated for a ssinus infection slightly over 2 weeks ago. She reports that she was on amoxicillin and prednisone. She reports that she felt better for a few days after the antibiotics, but the symptoms returned. She reports that she woke up this morning and had palpitation for a few seconds this morning. She denies palpitation at this time. She repots nasal congestion, rhinorrhea, and PND. She reports cough, sore throat, and chest congestion. She denies headache, nausea, emesis, diarrhea, or loss of taste or smell. She reports that she is currently on allegra D, flonase, and mucinex sinus. The history is provided by the patient. No  was used. REVIEW OF SYSTEMS     Review of Systems   Constitutional: Negative for activity change, appetite change, chills, fatigue and fever. HENT: Positive for congestion, postnasal drip, rhinorrhea, sinus pressure and sore throat. Negative for ear discharge and ear pain. Respiratory: Positive for cough and chest tightness. Negative for shortness of breath. Cardiovascular: Negative for chest pain. Gastrointestinal: Negative for diarrhea, nausea and vomiting. Genitourinary: Negative for dysuria. Skin: Negative for rash. Allergic/Immunologic: Negative for environmental allergies and food allergies. Neurological: Negative for dizziness and headaches.        PAST MEDICAL HISTORY         Diagnosis Date    Kidney stone     6/2016    PONV (postoperative nausea and vomiting)        SURGICALHISTORY     Patient  has a past surgical history that includes Cholecystectomy;  section; Tubal ligation; Lithotripsy (Left, 2016); Ureter stent placement (Left, 2016); Cystoscopy (2016); other surgical history (2016); and bone marrow biopsy (2017). CURRENT MEDICATIONS       Discharge Medication List as of 2022  5:18 PM      CONTINUE these medications which have NOT CHANGED    Details   lisinopril (PRINIVIL;ZESTRIL) 20 MG tablet Take 20 mg by mouth dailyHistorical Med      Fexofenadine HCl (ALLEGRA PO) Take by mouth as neededHistorical Med      fluticasone (FLONASE) 50 MCG/ACT nasal spray 1 spray by Each Nostril route dailyHistorical Med             ALLERGIES     Patient is is allergic to codeine and morphine. Patients   There is no immunization history on file for this patient. FAMILY HISTORY     Patient's family history includes Cancer in her mother; Diabetes in her mother; Heart Disease in her mother; High Blood Pressure in her father; Kidney Disease in her father. SOCIAL HISTORY     Patient  reports that she quit smoking about 4 years ago. Her smoking use included cigarettes. She has never used smokeless tobacco. She reports that she does not drink alcohol and does not use drugs. PHYSICAL EXAM     ED TRIAGE VITALS  BP: 135/65, Temp: 98.3 °F (36.8 °C), Pulse: 70, Resp: 16, SpO2: 100 %,Estimated body mass index is 34.72 kg/m² as calculated from the following:    Height as of this encounter: 6' (1.829 m). Weight as of this encounter: 256 lb (116.1 kg). ,Patient's last menstrual period was 2021. Physical Exam  Vitals and nursing note reviewed. Constitutional:       General: She is not in acute distress. Appearance: Normal appearance. She is not ill-appearing, toxic-appearing or diaphoretic. HENT:      Head: Normocephalic. Right Ear: Ear canal and external ear normal.      Left Ear: Ear canal and external ear normal.      Nose: No congestion or rhinorrhea.       Right Sinus: Maxillary sinus tenderness present. Left Sinus: Maxillary sinus tenderness present. Mouth/Throat:      Mouth: Mucous membranes are moist.      Pharynx: Oropharynx is clear. No oropharyngeal exudate or posterior oropharyngeal erythema. Cardiovascular:      Rate and Rhythm: Normal rate. Pulses: Normal pulses. Pulmonary:      Effort: Pulmonary effort is normal. No respiratory distress. Breath sounds: No stridor. No wheezing or rhonchi. Abdominal:      General: Abdomen is flat. Bowel sounds are normal.      Palpations: Abdomen is soft. Musculoskeletal:         General: No swelling or tenderness. Normal range of motion. Cervical back: Normal range of motion. Neurological:      General: No focal deficit present. Mental Status: She is alert and oriented to person, place, and time. Psychiatric:         Mood and Affect: Mood normal.         Behavior: Behavior normal.         DIAGNOSTIC RESULTS     Labs:No results found for this visit on 01/13/22. IMAGING:    No orders to display         EKG: None      URGENT CARE COURSE:     Vitals:    01/13/22 1701   BP: 135/65   Pulse: 70   Resp: 16   Temp: 98.3 °F (36.8 °C)   TempSrc: Temporal   SpO2: 100%   Weight: 256 lb (116.1 kg)   Height: 6' (1.829 m)       Medications - No data to display         PROCEDURES:  None    FINAL IMPRESSION      1. Acute maxillary sinusitis, recurrence not specified          DISPOSITION/ PLAN     Patient seen and evaluated for Sinus issues. Assessment consistent with maxillary sinusitis. She is started on doxycycline, prednisone, and tessalon pearls. Instructed to use OTC tylenol and motrin. Instructed to follow up with PCP. She is agreeable with the above plan and denies questions or concerns at this time.       PATIENT REFERRED TO:  Melvi Sanabria MD  [address] [place] [address] / [place] [address]      DISCHARGE MEDICATIONS:  Discharge Medication List as of 1/13/2022  5:18 PM      START taking these medications    Details

## 2022-01-13 NOTE — ED TRIAGE NOTES
Pt to [facility] ambulatory with nasal congestion and facial pressure. This started 3 weeks ago.

## 2022-01-14 ENCOUNTER — OFFICE VISIT (OUTPATIENT)
Dept: FAMILY MEDICINE CLINIC | Age: 42
End: 2022-01-14
Payer: COMMERCIAL

## 2022-01-14 VITALS
BODY MASS INDEX: 35.84 KG/M2 | WEIGHT: 256 LBS | TEMPERATURE: 98.6 F | DIASTOLIC BLOOD PRESSURE: 64 MMHG | OXYGEN SATURATION: 98 % | SYSTOLIC BLOOD PRESSURE: 106 MMHG | HEART RATE: 80 BPM | RESPIRATION RATE: 20 BRPM | HEIGHT: 71 IN

## 2022-01-14 DIAGNOSIS — U07.1 COVID-19: Primary | ICD-10-CM

## 2022-01-14 DIAGNOSIS — Z20.822 SUSPECTED COVID-19 VIRUS INFECTION: ICD-10-CM

## 2022-01-14 LAB
Lab: ABNORMAL
QC PASS/FAIL: ABNORMAL
SARS-COV-2 RDRP RESP QL NAA+PROBE: POSITIVE

## 2022-01-14 PROCEDURE — 99203 OFFICE O/P NEW LOW 30 MIN: CPT | Performed by: FAMILY MEDICINE

## 2022-01-14 PROCEDURE — 87635 SARS-COV-2 COVID-19 AMP PRB: CPT | Performed by: FAMILY MEDICINE

## 2022-01-14 NOTE — LETTER
[address]  [address]  [facility] [address]  Phone: [phone]  Fax: [phone]    Cuauhtemoc Fierro MD        January 14, 2022     Patient: Renea Montoya   YOB: 1980   Date of Visit: 1/14/2022       To Whom It May Concern: It is my medical opinion that Aspen Macias should remain out of work until 1/18/22. If you have any questions or concerns, please don't hesitate to call.     Sincerely,        Cuauhtemoc Fierro MD

## 2022-01-15 ASSESSMENT — ENCOUNTER SYMPTOMS
RHINORRHEA: 0
SORE THROAT: 0
COUGH: 1
CHEST TIGHTNESS: 0
DIARRHEA: 0
ABDOMINAL PAIN: 0
BACK PAIN: 0
SHORTNESS OF BREATH: 0
VOMITING: 0
NAUSEA: 0
EYES NEGATIVE: 1

## 2022-01-15 NOTE — PROGRESS NOTES
[address] [place] [address]  Dept: [phone]  Dept Fax: [phone]  Loc: [phone]  PROGRESS NOTE      VisitDate: 2022    Marine Barahona is a 39 y.o. female who presents today for:     Chief Complaint   Patient presents with    New Patient     Previously seen Aj Casey ED Follow-up     [facility]  Dx with Acute Maxillary Sinusitis. Tx with Doxy, PDN and Tessalon Perles. Sinus pressure, ears feel full, scratchy throat, coughing up yellowish phlegm, chest tightness, fatigue, body aches. Needs COVID to RTW         Subjective:  HPI  New patient comes in with sinus pain sore throat productive cough. Seen at urgent care and was told she did not qualify for a COVID test.  I explained to her that this was not appropriate. She has a  at home with lung cancer. She has been vaccinated    Review of Systems   Constitutional: Positive for fatigue. Negative for activity change, appetite change and fever. HENT: Positive for congestion. Negative for rhinorrhea and sore throat. Eyes: Negative. Respiratory: Positive for cough. Negative for chest tightness and shortness of breath. Cardiovascular: Negative for chest pain and palpitations. Gastrointestinal: Negative for abdominal pain, diarrhea, nausea and vomiting. Genitourinary: Negative for dysuria and urgency. Musculoskeletal: Negative for arthralgias and back pain. Neurological: Negative for dizziness and headaches. Psychiatric/Behavioral: Negative for dysphoric mood. The patient is not nervous/anxious.       Past Medical History:   Diagnosis Date    Hypertension     Kidney stone     2016    PONV (postoperative nausea and vomiting)     Vitamin D deficiency       Past Surgical History:   Procedure Laterality Date    BONE MARROW BIOPSY  2017     SECTION      x2    CHOLECYSTECTOMY      CYSTOSCOPY  2016    Dr Melinda Pandya LITHOTRIPSY Left 2016    Dr Loretta Clements HISTORY  2016    LEFT ESWL    TUBAL LIGATION      URETER STENT PLACEMENT Left 2016    Dr Zoila Sherman     Family History   Problem Relation Age of Onset    Cancer Mother         breast, cervical    Heart Disease Mother     Diabetes Mother     Kidney Disease Father         dialysis    High Blood Pressure Father      Social History     Tobacco Use    Smoking status: Former Smoker     Types: Cigarettes     Quit date: 2018     Years since quittin.0    Smokeless tobacco: Never Used    Tobacco comment: About a pack/week   Substance Use Topics    Alcohol use: No      Current Outpatient Medications   Medication Sig Dispense Refill    Cholecalciferol (VITAMIN D3) 125 MCG (5000 UT) TABS Take by mouth daily      albuterol sulfate (PROAIR RESPICLICK) 407 (90 Base) MCG/ACT aerosol powder inhalation Inhale 2 puffs into the lungs every 4 hours as needed for Wheezing or Shortness of Breath 1 each 0    doxycycline hyclate (VIBRAMYCIN) 100 MG capsule Take 1 capsule by mouth 2 times daily for 10 days 20 capsule 0    predniSONE (DELTASONE) 20 MG tablet Take 1 tablet by mouth 2 times daily for 5 days 10 tablet 0    benzonatate (TESSALON) 200 MG capsule Take 1 capsule by mouth 3 times daily as needed for Cough 30 capsule 0    lisinopril (PRINIVIL;ZESTRIL) 20 MG tablet Take 20 mg by mouth daily      Fexofenadine HCl (ALLEGRA PO) Take by mouth as needed      fluticasone (FLONASE) 50 MCG/ACT nasal spray 1 spray by Each Nostril route daily       No current facility-administered medications for this visit.      Allergies   Allergen Reactions    Codeine Nausea And Vomiting    Morphine Nausea And Vomiting     Health Maintenance   Topic Date Due    Hepatitis C screen  Never done    Varicella vaccine (1 of 2 - 2-dose childhood series) Never done    Depression Screen  Never done    HIV screen  Never done    Cervical cancer screen  Never done   [employer] Diabetes screen  Never done    Potassium monitoring  06/17/2017    Creatinine monitoring  06/17/2017    Lipid screen  Never done    COVID-19 Vaccine (3 - Booster for Moderna series) 05/21/2022    DTaP/Tdap/Td vaccine (2 - Td or Tdap) 08/04/2030    Flu vaccine  Completed    Hepatitis A vaccine  Aged Out    Hepatitis B vaccine  Aged Out    Hib vaccine  Aged Out    Meningococcal (ACWY) vaccine  Aged Out    Pneumococcal 0-64 years Vaccine  Aged Out         Objective:     Physical Exam  Constitutional:       General: She is not in acute distress. Appearance: She is well-developed. She is not diaphoretic. HENT:      Head: Normocephalic and atraumatic. Eyes:      General: No scleral icterus. Conjunctiva/sclera: Conjunctivae normal.   Neck:      Thyroid: No thyromegaly. Vascular: No JVD. Comments: No bruits  Cardiovascular:      Rate and Rhythm: Normal rate and regular rhythm. Heart sounds: Normal heart sounds. Pulmonary:      Effort: Pulmonary effort is normal. No respiratory distress. Breath sounds: Normal breath sounds. No wheezing or rales. Abdominal:      Palpations: Abdomen is soft. There is no mass. Tenderness: There is no abdominal tenderness. There is no guarding. Musculoskeletal:         General: No tenderness. Skin:     General: Skin is warm and dry. Findings: No rash. Neurological:      Mental Status: She is alert and oriented to person, place, and time. Cranial Nerves: No cranial nerve deficit. /64   Pulse 80   Temp 98.6 °F (37 °C) (Oral)   Resp 20   Ht 5' 10.5\" (1.791 m)   Wt 256 lb (116.1 kg)   LMP 12/30/2021   SpO2 98%   BMI 36.21 kg/m²       Impression/Plan:  1. COVID-19    2.  Suspected COVID-19 virus infection      Requested Prescriptions     Signed Prescriptions Disp Refills    albuterol sulfate (PROAIR RESPICLICK) 408 (90 Base) MCG/ACT aerosol powder inhalation 1 each 0     Sig: Inhale 2 puffs into the lungs every 4 hours as needed for Wheezing or Shortness of Breath     Orders Placed This Encounter   Procedures    POCT COVID-19 Rapid, NAAT     Order Specific Question:   Is this test for diagnosis or screening? Answer:   Diagnosis of ill patient     Order Specific Question:   Symptomatic for COVID-19 as defined by CDC? Answer:   Yes     Order Specific Question:   Date of Symptom Onset     Answer:   1/9/2022     Order Specific Question:   Hospitalized for COVID-19? Answer:   No     Order Specific Question:   Admitted to ICU for COVID-19? Answer:   No     Order Specific Question:   Employed in healthcare setting? Answer:   No     Order Specific Question:   Resident in a congregate (group) care setting? Answer:   No     Order Specific Question:   Pregnant? Answer:   No     Order Specific Question:   Previously tested for COVID-19? Answer:   No   Counseled on vitamin C D and zinc.  Maintain hydration call if symptoms worsen or persist    Patient giveneducational materials - see patient instructions. Discussed use, benefit, and side effects of prescribed medications. All patient questions answered. Pt voiced understanding. Reviewed health maintenance. Patient agreedwith treatment plan. Follow up as directed. **This report has been created using voice recognition software. It may contain minor errorswhich are inherent in voice recognition technology. **       Electronically signed by Melvi Sanabria MD on 1/15/2022 at 7:47 AM

## 2022-02-25 RX ORDER — LISINOPRIL 20 MG/1
20 TABLET ORAL DAILY
Qty: 30 TABLET | Refills: 5 | Status: SHIPPED | OUTPATIENT
Start: 2022-02-25 | End: 2022-06-27

## 2022-02-25 NOTE — TELEPHONE ENCOUNTER
Please approve or deny     Last Visit Date:  1/14/2022       Next Visit Date:    Visit date not found

## 2022-03-04 ENCOUNTER — TELEPHONE (OUTPATIENT)
Dept: FAMILY MEDICINE CLINIC | Age: 42
End: 2022-03-04

## 2022-03-04 NOTE — TELEPHONE ENCOUNTER
Called to notify keagan that we have not received the fax and that she can try to send it again- verified fax #

## 2022-03-04 NOTE — TELEPHONE ENCOUNTER
----- Message from Gerhardt Cart sent at 3/4/2022 11:41 AM EST -----  Subject: Message to Provider    QUESTIONS  Information for Provider? Tammy ma [place] with [employer] called   in to confirm that you had received a fax from them regarding Dinorah Spray. ext. [phone]  ---------------------------------------------------------------------------  --------------  [address]  What is the best way for the office to contact you? OK to leave message on   voicemail  Preferred Call Back Phone Number?  [phone]  ---------------------------------------------------------------------------  --------------  SCRIPT ANSWERS  undefined

## 2022-03-14 ENCOUNTER — TELEPHONE (OUTPATIENT)
Dept: FAMILY MEDICINE CLINIC | Age: 42
End: 2022-03-14

## 2022-03-14 NOTE — TELEPHONE ENCOUNTER
----- Message from Rafa Portillo sent at 3/14/2022 11:05 AM EDT -----  Subject: Message to Provider    QUESTIONS  Information for Provider? Irene from [facility] needs to know if Diabetes   questionaire for pt was received Please call [phone]  ---------------------------------------------------------------------------  --------------  CALL BACK INFO  What is the best way for the office to contact you? OK to leave message on   voicemail  Preferred Call Back Phone Number? [phone]  ---------------------------------------------------------------------------  --------------  SCRIPT ANSWERS  Relationship to Patient? Third Party  Representative Name?  Irene [facility]

## 2022-03-16 ENCOUNTER — OFFICE VISIT (OUTPATIENT)
Dept: FAMILY MEDICINE CLINIC | Age: 42
End: 2022-03-16
Payer: COMMERCIAL

## 2022-03-16 VITALS
HEART RATE: 72 BPM | DIASTOLIC BLOOD PRESSURE: 70 MMHG | BODY MASS INDEX: 37.9 KG/M2 | HEIGHT: 70 IN | TEMPERATURE: 97.9 F | RESPIRATION RATE: 18 BRPM | SYSTOLIC BLOOD PRESSURE: 118 MMHG | WEIGHT: 264.7 LBS

## 2022-03-16 DIAGNOSIS — Z00.00 ROUTINE GENERAL MEDICAL EXAMINATION AT A HEALTH CARE FACILITY: ICD-10-CM

## 2022-03-16 DIAGNOSIS — E66.9 CLASS 2 OBESITY WITH BODY MASS INDEX (BMI) OF 38.0 TO 38.9 IN ADULT, UNSPECIFIED OBESITY TYPE, UNSPECIFIED WHETHER SERIOUS COMORBIDITY PRESENT: Primary | ICD-10-CM

## 2022-03-16 PROCEDURE — 99213 OFFICE O/P EST LOW 20 MIN: CPT | Performed by: FAMILY MEDICINE

## 2022-03-16 RX ORDER — PHENTERMINE HYDROCHLORIDE 37.5 MG/1
37.5 TABLET ORAL
Qty: 30 TABLET | Refills: 0 | Status: SHIPPED | OUTPATIENT
Start: 2022-03-16 | End: 2022-04-14 | Stop reason: SDUPTHER

## 2022-03-16 SDOH — ECONOMIC STABILITY: FOOD INSECURITY: WITHIN THE PAST 12 MONTHS, THE FOOD YOU BOUGHT JUST DIDN'T LAST AND YOU DIDN'T HAVE MONEY TO GET MORE.: NEVER TRUE

## 2022-03-16 SDOH — ECONOMIC STABILITY: FOOD INSECURITY: WITHIN THE PAST 12 MONTHS, YOU WORRIED THAT YOUR FOOD WOULD RUN OUT BEFORE YOU GOT MONEY TO BUY MORE.: NEVER TRUE

## 2022-03-16 ASSESSMENT — PATIENT HEALTH QUESTIONNAIRE - PHQ9
SUM OF ALL RESPONSES TO PHQ QUESTIONS 1-9: 0
1. LITTLE INTEREST OR PLEASURE IN DOING THINGS: 0
SUM OF ALL RESPONSES TO PHQ QUESTIONS 1-9: 0
2. FEELING DOWN, DEPRESSED OR HOPELESS: 0
SUM OF ALL RESPONSES TO PHQ9 QUESTIONS 1 & 2: 0
SUM OF ALL RESPONSES TO PHQ QUESTIONS 1-9: 0
SUM OF ALL RESPONSES TO PHQ QUESTIONS 1-9: 0

## 2022-03-16 ASSESSMENT — SOCIAL DETERMINANTS OF HEALTH (SDOH): HOW HARD IS IT FOR YOU TO PAY FOR THE VERY BASICS LIKE FOOD, HOUSING, MEDICAL CARE, AND HEATING?: NOT HARD AT ALL

## 2022-03-19 ASSESSMENT — ENCOUNTER SYMPTOMS
NAUSEA: 0
RHINORRHEA: 0
EYES NEGATIVE: 1
ABDOMINAL PAIN: 0
COUGH: 0
VOMITING: 0
SORE THROAT: 0
BACK PAIN: 0
SHORTNESS OF BREATH: 0
CHEST TIGHTNESS: 0
DIARRHEA: 0

## 2022-03-19 NOTE — PROGRESS NOTES
[address] [place] [address]  Dept: [phone]  Dept Fax: [phone]  Loc: [phone]  PROGRESS NOTE      VisitDate: 3/16/2022    Lida Snow is a 39 y.o. 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 Been having trouble losing weight. Tried multiple programs at home without any success. Trying to increase her activity but difficult with her changing work schedule. Due for some blood work as well. Review of Systems   Constitutional: Negative for activity change, appetite change, fatigue and fever. HENT: Negative for congestion, rhinorrhea and sore throat. Eyes: Negative. Respiratory: Negative for cough, chest tightness and shortness of breath. Cardiovascular: Negative for chest pain and palpitations. Gastrointestinal: Negative for abdominal pain, diarrhea, nausea and vomiting. Genitourinary: Negative for dysuria and urgency. Musculoskeletal: Negative for arthralgias and back pain. Neurological: Negative for dizziness and headaches. Psychiatric/Behavioral: Negative for dysphoric mood. The patient is not nervous/anxious.       Past Medical History:   Diagnosis Date    Hypertension     Kidney stone     2016    PONV (postoperative nausea and vomiting)     Vitamin D deficiency       Past Surgical History:   Procedure Laterality Date    BONE MARROW BIOPSY  2017     SECTION      x2    CHOLECYSTECTOMY      CYSTOSCOPY  2016    Dr Army Georges LITHOTRIPSY Left 2016    Dr Delarosa Dural HISTORY  2016    LEFT ESWL    TUBAL LIGATION      URETER STENT PLACEMENT Left 2016    Dr Mauro Dinero     Family History   Problem Relation Age of Onset    Cancer Mother         breast, cervical    Heart Disease Mother     Diabetes Mother     Kidney Disease Father         dialysis    High Blood Pressure Father      Social History     Tobacco Use    Smoking status: Former Smoker     Types: Cigarettes     Quit date: 2018     Years since quittin.2    Smokeless tobacco: Never Used    Tobacco comment: About a pack/week   Substance Use Topics    Alcohol use: No      Current Outpatient Medications   Medication Sig Dispense Refill    ZINC PO Take 1 tablet by mouth daily      Ascorbic Acid (VITAMIN C PO) Take 1 tablet by mouth daily      UNABLE TO FIND Take 1 tablet by mouth daily Nervi-I      phentermine (ADIPEX-P) 37.5 MG tablet Take 1 tablet by mouth every morning (before breakfast) for 30 days. 30 tablet 0    lisinopril (PRINIVIL;ZESTRIL) 20 MG tablet Take 1 tablet by mouth daily 30 tablet 5    Cholecalciferol (VITAMIN D3) 125 MCG (5000 UT) TABS Take by mouth daily      Fexofenadine HCl (ALLEGRA PO) Take by mouth as needed      fluticasone (FLONASE) 50 MCG/ACT nasal spray 1 spray by Each Nostril route daily       No current facility-administered medications for this visit.      Allergies   Allergen Reactions    Codeine Nausea And Vomiting    Morphine Nausea And Vomiting     Health Maintenance   Topic Date Due    Hepatitis C screen  Never done    Varicella vaccine (1 of 2 - 2-dose childhood series) Never done    HIV screen  Never done    Cervical cancer screen  Never done    Diabetes screen  Never done    Potassium monitoring  2017    Creatinine monitoring  2017    Lipid screen  Never done    COVID-19 Vaccine (3 - Booster for Moderna series) 2022    Depression Screen  2023    DTaP/Tdap/Td vaccine (2 - Td or Tdap) 08/04/2030    Flu vaccine  Completed    Hepatitis A vaccine  Aged Out    Hepatitis B vaccine  Aged Out    Hib vaccine  Aged Out    Meningococcal (ACWY) vaccine  Aged Out    Pneumococcal 0-64 years Vaccine  Aged Out         Objective:     Physical Exam  Constitutional:       General: She is not in acute distress. Appearance: She is well-developed. She is not diaphoretic. HENT:      Head: Normocephalic and atraumatic. Eyes:      General: No scleral icterus. Conjunctiva/sclera: Conjunctivae normal.   Neck:      Thyroid: No thyromegaly. Vascular: No JVD. Comments: No bruits  Cardiovascular:      Rate and Rhythm: Normal rate and regular rhythm. Heart sounds: Normal heart sounds. Pulmonary:      Effort: Pulmonary effort is normal. No respiratory distress. Breath sounds: Normal breath sounds. No wheezing or rales. Abdominal:      Palpations: Abdomen is soft. There is no mass. Tenderness: There is no abdominal tenderness. There is no guarding. Musculoskeletal:         General: No tenderness. Skin:     General: Skin is warm and dry. Findings: No rash. Neurological:      Mental Status: She is alert and oriented to person, place, and time. Cranial Nerves: No cranial nerve deficit. /70 (Site: Left Upper Arm)   Pulse 72   Temp 97.9 °F (36.6 °C) (Oral)   Resp 18   Ht 5' 9.5\" (1.765 m)   Wt 264 lb 11.2 oz (120.1 kg)   BMI 38.53 kg/m²       Impression/Plan:  1. Class 2 obesity with body mass index (BMI) of 38.0 to 38.9 in adult, unspecified obesity type, unspecified whether serious comorbidity present    2. Routine general medical examination at a health care facility      Requested Prescriptions     Signed Prescriptions Disp Refills    phentermine (ADIPEX-P) 37.5 MG tablet 30 tablet 0     Sig: Take 1 tablet by mouth every morning (before breakfast) for 30 days.      Orders Placed This Encounter   Procedures    Lipid Panel     Standing Status: Future     Standing Expiration Date:   3/16/2023     Order Specific Question:   Is Patient Fasting?/# of Hours     Answer:   yes/12    Comprehensive Metabolic Panel     Standing Status:   Future     Standing Expiration Date:   3/16/2023    CBC with Auto Differential     Standing Status:   Future     Standing Expiration Date:   3/16/2023    Hemoglobin A1C     Standing Status:   Future     Standing Expiration Date:   3/16/2023   Comes regarding different weight loss medication she would like to try Adipex. Adipex prescribing protocol reviewed with the patient. Patient giveneducational materials - see patient instructions. Discussed use, benefit, and side effects of prescribed medications. All patient questions answered. Pt voiced understanding. Reviewed health maintenance. Patient agreedwith treatment plan. Follow up as directed. **This report has been created using voice recognition software. It may contain minor errorswhich are inherent in voice recognition technology. **       Electronically signed by Mari Molina MD on 3/19/2022 at 10:40 AM

## 2022-03-21 ENCOUNTER — NURSE ONLY (OUTPATIENT)
Dept: LAB | Age: 42
End: 2022-03-21

## 2022-03-21 DIAGNOSIS — Z00.00 ROUTINE GENERAL MEDICAL EXAMINATION AT A HEALTH CARE FACILITY: ICD-10-CM

## 2022-03-21 LAB
ALBUMIN SERPL-MCNC: 4.4 G/DL (ref 3.5–5.1)
ALP BLD-CCNC: 73 U/L (ref 38–126)
ALT SERPL-CCNC: 21 U/L (ref 11–66)
ANION GAP SERPL CALCULATED.3IONS-SCNC: 12 MEQ/L (ref 8–16)
AST SERPL-CCNC: 17 U/L (ref 5–40)
ATYPICAL LYMPHOCYTES: ABNORMAL %
AVERAGE GLUCOSE: 93 MG/DL (ref 70–126)
BASOPHILS # BLD: 0.6 %
BASOPHILS ABSOLUTE: 0.1 THOU/MM3 (ref 0–0.1)
BILIRUB SERPL-MCNC: 0.8 MG/DL (ref 0.3–1.2)
BUN BLDV-MCNC: 12 MG/DL (ref 7–22)
CALCIUM SERPL-MCNC: 9.6 MG/DL (ref 8.5–10.5)
CHLORIDE BLD-SCNC: 103 MEQ/L (ref 98–111)
CHOLESTEROL, TOTAL: 157 MG/DL (ref 100–199)
CO2: 26 MEQ/L (ref 23–33)
CREAT SERPL-MCNC: 0.7 MG/DL (ref 0.4–1.2)
EOSINOPHIL # BLD: 2.2 %
EOSINOPHILS ABSOLUTE: 0.3 THOU/MM3 (ref 0–0.4)
ERYTHROCYTE [DISTWIDTH] IN BLOOD BY AUTOMATED COUNT: 12.8 % (ref 11.5–14.5)
ERYTHROCYTE [DISTWIDTH] IN BLOOD BY AUTOMATED COUNT: 42.2 FL (ref 35–45)
GFR SERPL CREATININE-BSD FRML MDRD: > 90 ML/MIN/1.73M2
GLUCOSE BLD-MCNC: 95 MG/DL (ref 70–108)
HBA1C MFR BLD: 5.1 % (ref 4.4–6.4)
HCT VFR BLD CALC: 41.6 % (ref 37–47)
HDLC SERPL-MCNC: 52 MG/DL
HEMOGLOBIN: 13.9 GM/DL (ref 12–16)
IMMATURE GRANS (ABS): 0.05 THOU/MM3 (ref 0–0.07)
IMMATURE GRANULOCYTES: 0.3 %
LDL CHOLESTEROL CALCULATED: 84 MG/DL
LYMPHOCYTES # BLD: 30.1 %
LYMPHOCYTES ABSOLUTE: 4.3 THOU/MM3 (ref 1–4.8)
MCH RBC QN AUTO: 31 PG (ref 26–33)
MCHC RBC AUTO-ENTMCNC: 33.4 GM/DL (ref 32.2–35.5)
MCV RBC AUTO: 92.9 FL (ref 81–99)
MONOCYTES # BLD: 6 %
MONOCYTES ABSOLUTE: 0.9 THOU/MM3 (ref 0.4–1.3)
NUCLEATED RED BLOOD CELLS: 0 /100 WBC
PLATELET # BLD: 309 THOU/MM3 (ref 130–400)
PLATELET ESTIMATE: ADEQUATE
PMV BLD AUTO: 10 FL (ref 9.4–12.4)
POTASSIUM SERPL-SCNC: 4.2 MEQ/L (ref 3.5–5.2)
RBC # BLD: 4.48 MILL/MM3 (ref 4.2–5.4)
SCAN OF BLOOD SMEAR: NORMAL
SEG NEUTROPHILS: 60.8 %
SEGMENTED NEUTROPHILS ABSOLUTE COUNT: 8.8 THOU/MM3 (ref 1.8–7.7)
SODIUM BLD-SCNC: 141 MEQ/L (ref 135–145)
TOTAL PROTEIN: 7.4 G/DL (ref 6.1–8)
TRIGL SERPL-MCNC: 107 MG/DL (ref 0–199)
WBC # BLD: 14.4 THOU/MM3 (ref 4.8–10.8)

## 2022-03-23 ENCOUNTER — HOSPITAL ENCOUNTER (EMERGENCY)
Age: 42
Discharge: HOME | End: 2022-03-23
Payer: COMMERCIAL

## 2022-03-23 VITALS
TEMPERATURE: 97 F | SYSTOLIC BLOOD PRESSURE: 140 MMHG | RESPIRATION RATE: 16 BRPM | OXYGEN SATURATION: 99 % | WEIGHT: 260 LBS | DIASTOLIC BLOOD PRESSURE: 71 MMHG | BODY MASS INDEX: 37.84 KG/M2 | HEART RATE: 78 BPM

## 2022-03-23 DIAGNOSIS — J01.40 ACUTE PANSINUSITIS, RECURRENCE NOT SPECIFIED: Primary | ICD-10-CM

## 2022-03-23 PROCEDURE — 99213 OFFICE O/P EST LOW 20 MIN: CPT | Performed by: NURSE PRACTITIONER

## 2022-03-23 PROCEDURE — 99213 OFFICE O/P EST LOW 20 MIN: CPT

## 2022-03-23 RX ORDER — AMOXICILLIN AND CLAVULANATE POTASSIUM 875; 125 MG/1; MG/1
1 TABLET, FILM COATED ORAL 2 TIMES DAILY
Qty: 14 TABLET | Refills: 0 | Status: SHIPPED | OUTPATIENT
Start: 2022-03-23 | End: 2022-03-30

## 2022-03-23 ASSESSMENT — ENCOUNTER SYMPTOMS
CHEST TIGHTNESS: 0
NAUSEA: 0
RHINORRHEA: 1
DIARRHEA: 0
VOMITING: 0
SORE THROAT: 1
SHORTNESS OF BREATH: 0
SINUS PRESSURE: 1
COUGH: 1

## 2022-03-23 ASSESSMENT — PAIN DESCRIPTION - LOCATION: LOCATION: HEAD

## 2022-03-23 ASSESSMENT — PAIN SCALES - GENERAL: PAINLEVEL_OUTOF10: 5

## 2022-03-23 ASSESSMENT — PAIN DESCRIPTION - PAIN TYPE: TYPE: ACUTE PAIN

## 2022-03-23 ASSESSMENT — PAIN DESCRIPTION - DESCRIPTORS: DESCRIPTORS: ACHING;PRESSURE

## 2022-03-23 ASSESSMENT — PAIN DESCRIPTION - FREQUENCY: FREQUENCY: INTERMITTENT

## 2022-03-23 NOTE — ED TRIAGE NOTES
Pt ambulatory into [facility] with c/o sinus pressure drainage and congestion for the past week. Pt states she has taken all her allergy medications so thinks she needs an antibiotic. Pt states her  is battling lung cancer.

## 2022-03-23 NOTE — ED NOTES
Pt verbalized discharge instructions. Pt informed to go to ER if develop chest pain, shortness of breath or abdominal pain. Pt ambulatory out in stable condition. Assessment unchanged.        Sarah Husain RN  03/23/22 3415

## 2022-03-23 NOTE — ED PROVIDER NOTES
[facility]       CHIEF COMPLAINT       Chief Complaint   Patient presents with    Sinusitis    Pharyngitis       Nurses Notes reviewed and I agree except as noted in the HPI. HISTORY OF PRESENT ILLNESS   Dora Rodriguez is a 39 y.o. female who [facility] to [facility] urgent care for evaluation of sinus congestion and pharyngitis. She reports her symptoms started roughly 1 week ago. She reports that she has been taking her allergy med and Flonase without relief. She is concerned thinking she needs an antibiotic. She does report that she recently started on Adipex for weight loss. She denies fever or chills. She does report nasal congestion, rhinorrhea, postnasal drainage, and mild cough. She reports pharyngitis, headache, and sinus pressure. The history is provided by the patient. No  was used. REVIEW OF SYSTEMS     Review of Systems   Constitutional: Negative for activity change, appetite change, chills, fatigue and fever. HENT: Positive for congestion, postnasal drip, rhinorrhea, sinus pressure and sore throat. Negative for ear discharge and ear pain. Respiratory: Positive for cough. Negative for chest tightness and shortness of breath. Cardiovascular: Negative for chest pain. Gastrointestinal: Negative for diarrhea, nausea and vomiting. Genitourinary: Negative for dysuria. Skin: Negative for rash. Allergic/Immunologic: Negative for environmental allergies and food allergies. Neurological: Negative for dizziness and headaches. PAST MEDICAL HISTORY         Diagnosis Date    Hypertension     Kidney stone     2016    PONV (postoperative nausea and vomiting)     Vitamin D deficiency        SURGICALHISTORY     Patient  has a past surgical history that includes Cholecystectomy;  section; Tubal ligation; Lithotripsy (Left, 2016); Ureter stent placement (Left, 2016);  Cystoscopy (06/22/2016); other surgical history (07/13/2016); and bone marrow biopsy (2017). CURRENT MEDICATIONS       Discharge Medication List as of 3/23/2022  3:25 PM      CONTINUE these medications which have NOT CHANGED    Details   ZINC PO Take 1 tablet by mouth dailyHistorical Med      Ascorbic Acid (VITAMIN C PO) Take 1 tablet by mouth dailyHistorical Med      UNABLE TO FIND Take 1 tablet by mouth daily Nervi-IHistorical Med      phentermine (ADIPEX-P) 37.5 MG tablet Take 1 tablet by mouth every morning (before breakfast) for 30 days. , Disp-30 tablet, R-0Normal      lisinopril (PRINIVIL;ZESTRIL) 20 MG tablet Take 1 tablet by mouth daily, Disp-30 tablet, R-5Normal      Cholecalciferol (VITAMIN D3) 125 MCG (5000 UT) TABS Take by mouth dailyHistorical Med      Fexofenadine HCl (ALLEGRA PO) Take by mouth as neededHistorical Med      fluticasone (FLONASE) 50 MCG/ACT nasal spray 1 spray by Each Nostril route dailyHistorical Med             ALLERGIES     Patient is is allergic to codeine and morphine. Patients   Immunization History   Administered Date(s) Administered    COVID-19, Moderna, Primary or Immunocompromised, PF, 100mcg/0.5mL 10/24/2021, 11/21/2021    Influenza Virus Vaccine 11/10/2020    Influenza, Toby Beckers, IM, PF (6 mo and older Fluzone, Flulaval, Fluarix, and 3 yrs and older Afluria) 08/24/2017, 10/24/2021    Tdap (Boostrix, Adacel) 08/04/2020       FAMILY HISTORY     Patient's family history includes Cancer in her mother; Diabetes in her mother; Heart Disease in her mother; High Blood Pressure in her father; Kidney Disease in her father. SOCIAL HISTORY     Patient  reports that she has been smoking cigarettes and e-cigarettes. She has never used smokeless tobacco. She reports that she does not drink alcohol and does not use drugs.     PHYSICAL EXAM     ED TRIAGE VITALS  BP: (!) 140/71, Temp: 97 °F (36.1 °C), Pulse: 78, Resp: 16, SpO2: 99 %,Estimated body mass index is 37.84 kg/m² as calculated from the following:    Height as of 3/16/22: 5' 9.5\" (1.765 m). Weight as of this encounter: 260 lb (117.9 kg). ,Patient's last menstrual period was 03/17/2022. Physical Exam  Vitals and nursing note reviewed. Constitutional:       General: She is not in acute distress. Appearance: Normal appearance. She is not ill-appearing, toxic-appearing or diaphoretic. HENT:      Head: Normocephalic. Right Ear: Ear canal and external ear normal.      Left Ear: Ear canal and external ear normal.      Nose: No congestion or rhinorrhea. Right Sinus: Maxillary sinus tenderness and frontal sinus tenderness present. Left Sinus: Maxillary sinus tenderness and frontal sinus tenderness present. Mouth/Throat:      Mouth: Mucous membranes are moist.      Pharynx: Oropharynx is clear. No oropharyngeal exudate or posterior oropharyngeal erythema. Cardiovascular:      Rate and Rhythm: Normal rate. Pulses: Normal pulses. Pulmonary:      Effort: Pulmonary effort is normal. No respiratory distress. Breath sounds: No stridor. No wheezing or rhonchi. Abdominal:      General: Abdomen is flat. Bowel sounds are normal.      Palpations: Abdomen is soft. Musculoskeletal:         General: No swelling or tenderness. Normal range of motion. Cervical back: Normal range of motion. Neurological:      General: No focal deficit present. Mental Status: She is alert and oriented to person, place, and time. Psychiatric:         Mood and Affect: Mood normal.         Behavior: Behavior normal.         DIAGNOSTIC RESULTS     Labs:No results found for this visit on 03/23/22. IMAGING:    No orders to display         EKG: None      URGENT CARE COURSE:     Vitals:    03/23/22 1507   BP: (!) 140/71   Pulse: 78   Resp: 16   Temp: 97 °F (36.1 °C)   TempSrc: Temporal   SpO2: 99%   Weight: 260 lb (117.9 kg)       Medications - No data to display         PROCEDURES:  None    FINAL IMPRESSION      1.  Acute pansinusitis, recurrence not specified          DISPOSITION/ PLAN     Patient seen and evaluated for the above symptoms. Assessment consistent with likely acute pansinusitis. She is provided a prescription for Augmentin. She is instructed to continue to take her allergy medication and Flonase daily. She is instructed to follow-up with her PCP in 3 to 5 days if no worsening symptoms. She is agreeable with the above plan and denies questions or concerns at this time.       PATIENT REFERRED TO:  Yaz Naidu MD  [address] [place] [address] / Dayana Honeycutt [id number]      DISCHARGE MEDICATIONS:  Discharge Medication List as of 3/23/2022  3:25 PM      START taking these medications    Details   amoxicillin-clavulanate (AUGMENTIN) 875-125 MG per tablet Take 1 tablet by mouth 2 times daily for 7 days, Disp-14 tablet, R-0Normal             Discharge Medication List as of 3/23/2022  3:25 PM          Discharge Medication List as of 3/23/2022  3:25 PM          KOJO Pérez CNP    (Please note that portions of this note were completed with a voice recognition program. Efforts were made to edit the dictations but occasionally words are mis-transcribed.)           KOJO Pérez CNP  03/23/22 5850

## 2022-04-14 ENCOUNTER — OFFICE VISIT (OUTPATIENT)
Dept: FAMILY MEDICINE CLINIC | Age: 42
End: 2022-04-14
Payer: COMMERCIAL

## 2022-04-14 VITALS
SYSTOLIC BLOOD PRESSURE: 120 MMHG | TEMPERATURE: 98.1 F | BODY MASS INDEX: 35.42 KG/M2 | DIASTOLIC BLOOD PRESSURE: 76 MMHG | HEIGHT: 71 IN | WEIGHT: 253 LBS | HEART RATE: 72 BPM | RESPIRATION RATE: 20 BRPM

## 2022-04-14 DIAGNOSIS — Z00.00 ROUTINE GENERAL MEDICAL EXAMINATION AT A HEALTH CARE FACILITY: Primary | ICD-10-CM

## 2022-04-14 DIAGNOSIS — E66.9 CLASS 2 OBESITY WITH BODY MASS INDEX (BMI) OF 38.0 TO 38.9 IN ADULT, UNSPECIFIED OBESITY TYPE, UNSPECIFIED WHETHER SERIOUS COMORBIDITY PRESENT: ICD-10-CM

## 2022-04-14 PROCEDURE — 99396 PREV VISIT EST AGE 40-64: CPT | Performed by: FAMILY MEDICINE

## 2022-04-14 RX ORDER — CETIRIZINE HYDROCHLORIDE 10 MG/1
10 TABLET ORAL DAILY
COMMUNITY

## 2022-04-14 RX ORDER — PHENTERMINE HYDROCHLORIDE 37.5 MG/1
37.5 TABLET ORAL
Qty: 30 TABLET | Refills: 0 | Status: SHIPPED | OUTPATIENT
Start: 2022-04-14 | End: 2022-05-12 | Stop reason: SDUPTHER

## 2022-04-14 ASSESSMENT — ENCOUNTER SYMPTOMS
RHINORRHEA: 0
CONSTIPATION: 0
NAUSEA: 0
ABDOMINAL PAIN: 0
BACK PAIN: 0
VOMITING: 0
SORE THROAT: 0
DIARRHEA: 0

## 2022-04-14 NOTE — PATIENT INSTRUCTIONS
Patient Education        Stopping Smoking: Care Instructions  Your Care Instructions     Cigarette smokers crave the nicotine in cigarettes. Giving it up is much harder than simply changing a habit. Your body has to stop craving the nicotine. It is hard to quit, but you can do it. There are many tools that people use to quitsmoking. You may find that combining tools works best for you. There are several steps to quitting. First you get ready to quit. Then you get support to help you. After that, you learn new skills and behaviors to become anonsmoker. For many people, a necessary step is getting and using medicine. Your doctor will help you set up the plan that best meets your needs. You may want to attend a smoking cessation program to help you quit smoking. When you choose a program, look for one that has proven success. Ask your doctor for ideas. You will greatly increase your chances of success if you take medicineas well as get counseling or join a cessation program.  Some of the changes you feel when you first quit tobacco are uncomfortable. Your body will miss the nicotine at first, and you may feel short-tempered and grumpy. You may have trouble sleeping or concentrating. Medicine can help you deal with these symptoms. You may struggle with changing your smoking habits and rituals. The last step is the tricky one: Be prepared for the smoking urge to continue for a time. This is a lot to deal with, but keep at it. You willfeel better. Follow-up care is a key part of your treatment and safety. Be sure to make and go to all appointments, and call your doctor if you are having problems. It's also a good idea to know your test results and keep alist of the medicines you take. How can you care for yourself at home?  Ask your family, friends, and coworkers for support. You have a better chance of quitting if you have help and support.    Join a support group, such as Nicotine Anonymous, for people who are trying to quit smoking.  Consider signing up for a smoking cessation program, such as the American Lung Association's Freedom from Smoking program.   Get text messaging support. Go to the website at www.smokefree. gov to sign up for the [facility] program.   Set a quit date. Pick your date carefully so that it is not right in the middle of a big deadline or stressful time. Once you quit, do not even take a puff. Get rid of all ashtrays and lighters after your last cigarette. Clean your house and your clothes so that they do not smell of smoke.  Learn how to be a nonsmoker. Think about ways you can avoid those things that make you reach for a cigarette. ? Avoid situations that put you at greatest risk for smoking. For some people, it is hard to have a drink with friends without smoking. For others, they might skip a coffee break with coworkers who smoke. ? Change your daily routine. Take a different route to work or eat a meal in a different place.  Cut down on stress. Calm yourself or release tension by doing an activity you enjoy, such as reading a book, taking a hot bath, or gardening.  Talk to your doctor or pharmacist about nicotine replacement therapy, which replaces the nicotine in your body. You still get nicotine but you do not use tobacco. Nicotine replacement products help you slowly reduce the amount of nicotine you need. These products come in several forms, many of them available over-the-counter:  ? Nicotine patches  ? Nicotine gum and lozenges  ? Nicotine inhaler   Ask your doctor about bupropion (Wellbutrin) or varenicline (Chantix), which are prescription medicines. They do not contain nicotine. They help you by reducing withdrawal symptoms, such as stress and anxiety.  Some people find hypnosis, acupuncture, and massage helpful for ending the smoking habit.  Eat a healthy diet and get regular exercise.  Having healthy habits will help your body move past its craving for nicotine.  Be prepared to keep trying. Most people are not successful the first few times they try to quit. Do not get mad at yourself if you smoke again. Make a list of things you learned and think about when you want to try again, such as next week, next month, or next year. Where can you learn more? Go to https://WatchDoxpeemelyeweb.ConsortiEX. org and sign in to your [employer] account. Enter P404 in the Rotten Tomatoes box to learn more about \"Stopping Smoking: Care Instructions. \"     If you do not have an account, please click on the \"Sign Up Now\" link. Current as of: October 28, 2021               Content Version: 13.2  © 2006-2022 Healthwise, Incorporated. Care instructions adapted under license by [facility] ([facility]). If you have questions about a medical condition or this instruction, always ask your healthcare professional. [address] any warranty or liability for your use of this information.

## 2022-04-25 ENCOUNTER — OFFICE VISIT (OUTPATIENT)
Dept: FAMILY MEDICINE CLINIC | Age: 42
End: 2022-04-25
Payer: COMMERCIAL

## 2022-04-25 VITALS
BODY MASS INDEX: 35.65 KG/M2 | RESPIRATION RATE: 16 BRPM | DIASTOLIC BLOOD PRESSURE: 62 MMHG | HEART RATE: 70 BPM | OXYGEN SATURATION: 98 % | SYSTOLIC BLOOD PRESSURE: 104 MMHG | WEIGHT: 252 LBS | TEMPERATURE: 98.1 F

## 2022-04-25 DIAGNOSIS — J32.9 RECURRENT SINUSITIS: Primary | ICD-10-CM

## 2022-04-25 PROCEDURE — 99213 OFFICE O/P EST LOW 20 MIN: CPT | Performed by: NURSE PRACTITIONER

## 2022-04-25 PROCEDURE — 96372 THER/PROPH/DIAG INJ SC/IM: CPT | Performed by: NURSE PRACTITIONER

## 2022-04-25 RX ORDER — PREDNISONE 1 MG/1
TABLET ORAL
Qty: 30 TABLET | Refills: 0 | Status: SHIPPED | OUTPATIENT
Start: 2022-04-25 | End: 2022-05-05

## 2022-04-25 RX ORDER — METHYLPREDNISOLONE ACETATE 80 MG/ML
120 INJECTION, SUSPENSION INTRA-ARTICULAR; INTRALESIONAL; INTRAMUSCULAR; SOFT TISSUE ONCE
Status: COMPLETED | OUTPATIENT
Start: 2022-04-25 | End: 2022-04-25

## 2022-04-25 RX ORDER — AMOXICILLIN AND CLAVULANATE POTASSIUM 500; 125 MG/1; MG/1
1 TABLET, FILM COATED ORAL 3 TIMES DAILY
Qty: 30 TABLET | Refills: 0 | Status: SHIPPED | OUTPATIENT
Start: 2022-04-25 | End: 2022-05-05

## 2022-04-25 RX ADMIN — METHYLPREDNISOLONE ACETATE 120 MG: 80 INJECTION, SUSPENSION INTRA-ARTICULAR; INTRALESIONAL; INTRAMUSCULAR; SOFT TISSUE at 09:39

## 2022-04-25 NOTE — PROGRESS NOTES
Administrations This Visit     methylPREDNISolone acetate (DEPO-MEDROL) injection 120 mg     Admin Date  04/25/2022  09:39 Action  Given Dose  120 mg Route  IntraMUSCular Site  Dorsogluteal Right Administered By  Raven Stanley CMA ([address] [place] [address])    Ordering Provider: KOJO Zamora CNP    NDC: [address]-3    Lot#: [id number]    : [address] [place] [address] Maxine    Patient Supplied?: No

## 2022-04-25 NOTE — PATIENT INSTRUCTIONS
for changes in your health, and be sure to contact your doctor if:     Your symptoms do not get better.      You have problems doing the nasal washes. Where can you learn more? Go to https://eedenpeUniversity of Wollongong.[employer]. org and sign in to your Touchstorm account. Enter [id number] [phone] in the [facility] box to learn more about \"Saline Nasal Washes: Care Instructions. \"     If you do not have an account, please click on the \"Sign Up Now\" link. Current as of: September 8, 2021               Content Version: 13.2  © 6252-5662 [employer]. Care instructions adapted under license by [facility] ([facility]). If you have questions about a medical condition or this instruction, always ask your healthcare professional. Kiana Toro any warranty or liability for your use of this information. Patient Education        Chronic Sinusitis: Care Instructions  Your Care Instructions     Sinusitis is an infection of the lining of the sinus cavities in your head. Itcauses pain and pressure in your head and face. Sinusitis can be short-term (acute) or long-term (chronic). Chronic sinusitis lasts 12 weeks or longer. It is often caused by a bacterial or fungalinfection. Other things, such as allergies, may also be involved. Chronic sinusitis may be hard to treat. It can lead to permanent changes in the mucous membranes that line the sinuses. It may make future sinus infectionsmore likely. The infection may take some time to treat. Antibiotics are usually used if the infection is caused by bacteria. You may also need to use a corticosteroid nasal spray. If the infection is not cured after you try two or more different antibiotics, you may want to talk with your doctor about surgery or allergytesting. If the sinusitis is caused by a fungal infection, you may need to takeantifungals or other medicines. You may also need surgery. Follow-up care is a key part of your treatment and safety.  Be sure to make and go to all appointments, and call your doctor if you are having problems. It's also a good idea to know your test results and keep alist of the medicines you take. How can you care for yourself at home? Medicines     Be safe with medicines. Take your medicines exactly as prescribed. Call your doctor if you think you are having a problem with your medicine. You will get more details on the specific medicines your doctor prescribes.      Take your antibiotics as directed. Do not stop taking them just because you feel better. You need to take the full course of antibiotics.      Your doctor may recommend a corticosteroid nasal spray, wash, drops, or pills. Take this medicine exactly as prescribed. At home     Breathe warm, moist air. You can use a steamy shower, a hot bath, or a sink filled with hot water. Avoid cold, dry air. Using a humidifier in your home may help. Follow the instructions for cleaning the machine.      Use saline (saltwater) nasal washes every day. This helps keep your nasal passages open. It also can wash out mucus and bacteria. ? You can buy saline nose drops at a grocery store or drugstore. ? You can make your own at home. Add 1 teaspoon of salt and 1 teaspoon of baking soda to 2 cups of distilled water. If you make your own, fill a bulb syringe with the solution. Then insert the tip into your nostril and squeeze gently. Izora Booty your nose.      Put a warm, wet towel or a warm gel pack on your face 3 or 4 times a day. Leave it on 5 to 10 minutes each time.      Do not smoke or breathe secondhand smoke. Smoking can make sinusitis worse. If you need help quitting, talk to your doctor about stop-smoking programs and medicines. These can increase your chances of quitting for good. When should you call for help? Call your doctor now or seek immediate medical care if:     You have new or worse symptoms of infection, such as:  ? Increased pain, swelling, warmth, or redness. ?  Red streaks leading from the area. ? Pus draining from the area. ? A fever. Watch closely for changes in your health, and be sure to contact your doctor if:     The mucus from your nose becomes thicker (like pus) or has new blood in it.      You do not get better as expected. Where can you learn more? Go to https://chpepiceweb.[employer]bellion Media [employer]. org and sign in to your [email or web address] account. Enter E774 in the FusionOne box to learn more about \"Chronic Sinusitis: Care Instructions. \"     If you do not have an account, please click on the \"Sign Up Now\" link. Current as of: September 8, 2021               Content Version: 13.2  © 2006-2022 Healthwise, Incorporated. Care instructions adapted under license by [facility] ([facility]). If you have questions about a medical condition or this instruction, always ask your healthcare professional. [address] any warranty or liability for your use of this information.

## 2022-04-27 ASSESSMENT — ENCOUNTER SYMPTOMS
COUGH: 0
ABDOMINAL DISTENTION: 0
SINUS PRESSURE: 1
CHEST TIGHTNESS: 0
BACK PAIN: 0
ABDOMINAL PAIN: 0
SHORTNESS OF BREATH: 0
SINUS PAIN: 1
WHEEZING: 0

## 2022-04-27 NOTE — PROGRESS NOTES
[address] [address] [address]aye [facility] [address]  Dept: [phone]  Dept Fax: [phone]  Loc: [phone]  PROGRESS NOTE      VisitDate: 4/25/2022    Janny Roy is a 39 y.o. female who presents today for:     Chief Complaint   Patient presents with    Sinus Problem     c/o sinus jose juan, drainage since she had covid in Dec. Had a sinus inf about a month ago, tx with Augmentin. Subjective:  Patient presents with complaint of recurrent sinus congestion facial pressure and drainage since COVID in December. Treated approximately a month ago with Augmentin. Denies fever, chest pain, shortness of breath, dizziness abdominal pain. History of hypertension, kidney stones. Take medications as prescribed. Patient reports she is currently taking Zyrtec and Flonase routinely. Review of Systems   Constitutional: Negative for activity change, appetite change, chills, fatigue and fever. HENT: Positive for congestion, sinus pressure and sinus pain. Eyes: Negative for visual disturbance. Respiratory: Negative for cough, chest tightness, shortness of breath and wheezing. Cardiovascular: Negative for chest pain, palpitations and leg swelling. Gastrointestinal: Negative for abdominal distention and abdominal pain. Genitourinary: Negative for dysuria. Musculoskeletal: Negative for arthralgias, back pain and neck pain. Skin: Negative. Negative for rash. Neurological: Positive for headaches. Negative for dizziness and light-headedness. Hematological: Negative for adenopathy. Psychiatric/Behavioral: Negative for decreased concentration and dysphoric mood. All other systems reviewed and are negative.     Past Medical History:   Diagnosis Date    Hypertension     Kidney stone     6/2016    PONV (postoperative nausea and vomiting)     Vitamin D deficiency       Past Surgical History:   Procedure Laterality Date    BONE MARROW BIOPSY  2017     SECTION      x2    CHOLECYSTECTOMY      CYSTOSCOPY  2016    Dr Shonna Noonan LITHOTRIPSY Left 2016    Dr Lachelle Vasquez  2016    LEFT ESWL    TUBAL LIGATION      URETER STENT PLACEMENT Left 2016    Dr [facility]     Family History   Problem Relation Age of Onset    Cancer Mother         breast, cervical    Heart Disease Mother     Diabetes Mother     Kidney Disease Father         dialysis    High Blood Pressure Father      Social History     Tobacco Use    Smoking status: Current Some Day Smoker     Types: Cigarettes, E-Cigarettes     Last attempt to quit: 2018     Years since quittin.3    Smokeless tobacco: Never Used    Tobacco comment: About a pack/week 25 yrs. Vapes socially   Substance Use Topics    Alcohol use: Yes     Comment: socially      Current Outpatient Medications   Medication Sig Dispense Refill    amoxicillin-clavulanate (AUGMENTIN) 500-125 MG per tablet Take 1 tablet by mouth 3 times daily for 10 days 30 tablet 0    predniSONE (DELTASONE) 5 MG tablet 4 po qd for 3 days, then 3 po qd for 3 days, then 2 po qd for 3 days, then 1 po qd for 3 days 30 tablet 0    cetirizine (ZYRTEC) 10 MG tablet Take 10 mg by mouth daily      phentermine (ADIPEX-P) 37.5 MG tablet Take 1 tablet by mouth every morning (before breakfast) for 30 days. 30 tablet 0    ZINC PO Take 1 tablet by mouth daily      Ascorbic Acid (VITAMIN C PO) Take 1 tablet by mouth daily      lisinopril (PRINIVIL;ZESTRIL) 20 MG tablet Take 1 tablet by mouth daily 30 tablet 5    Cholecalciferol (VITAMIN D3) 125 MCG (5000 UT) TABS Take by mouth daily      fluticasone (FLONASE) 50 MCG/ACT nasal spray 1 spray by Each Nostril route daily       No current facility-administered medications for this visit.      Allergies   Allergen Reactions    Codeine Nausea And Vomiting    Morphine Nausea And Vomiting     Health Maintenance   Topic Date Due    Varicella vaccine (1 of 2 - 2-dose childhood series) Never done    Pneumococcal 0-64 years Vaccine (1 - PCV) Never done    HIV screen  Never done    Hepatitis C screen  Never done    Cervical cancer screen  Never done    COVID-19 Vaccine (3 - Booster for Moderna series) 04/21/2022    Depression Screen  03/16/2023    Potassium  03/21/2023    Creatinine  03/21/2023    Lipids  03/21/2027    DTaP/Tdap/Td vaccine (2 - Td or Tdap) 08/04/2030    Flu vaccine  Completed    Hepatitis A vaccine  Aged Out    Hepatitis B vaccine  Aged Out    Hib vaccine  Aged Out    Meningococcal (ACWY) vaccine  Aged Out         Objective:     Physical Exam  Vitals and nursing note reviewed. Constitutional:       Appearance: Normal appearance. She is well-developed and normal weight. HENT:      Head: Normocephalic. Right Ear: Tympanic membrane and ear canal normal.      Left Ear: Tympanic membrane and ear canal normal.      Nose: Mucosal edema and rhinorrhea present. Right Sinus: Maxillary sinus tenderness and frontal sinus tenderness present. Left Sinus: Maxillary sinus tenderness and frontal sinus tenderness present. Mouth/Throat:      Pharynx: Oropharynx is clear. Eyes:      Extraocular Movements: Extraocular movements intact. Conjunctiva/sclera: Conjunctivae normal.   Cardiovascular:      Rate and Rhythm: Normal rate and regular rhythm. Pulses: Normal pulses. Heart sounds: Normal heart sounds. Pulmonary:      Effort: Pulmonary effort is normal.      Breath sounds: Normal breath sounds. Abdominal:      General: Bowel sounds are normal.      Palpations: Abdomen is soft. Musculoskeletal:         General: Normal range of motion. Cervical back: Neck supple. Skin:     General: Skin is warm and dry. Neurological:      General: No focal deficit present. Mental Status: She is alert and oriented to person, place, and time.    Psychiatric:         Mood and Affect: Mood normal. Thought Content: Thought content normal.         Judgment: Judgment normal.       /62   Pulse 70   Temp 98.1 °F (36.7 °C) (Oral)   Resp 16   Wt 252 lb (114.3 kg)   SpO2 98%   BMI 35.65 kg/m²       Impression/Plan:  1. Recurrent sinusitis      Requested Prescriptions     Signed Prescriptions Disp Refills    amoxicillin-clavulanate (AUGMENTIN) 500-125 MG per tablet 30 tablet 0     Sig: Take 1 tablet by mouth 3 times daily for 10 days    predniSONE (DELTASONE) 5 MG tablet 30 tablet 0     Si po qd for 3 days, then 3 po qd for 3 days, then 2 po qd for 3 days, then 1 po qd for 3 days     Orders Placed This Encounter   Procedures    CT SINUS WO CONTRAST     Standing Status:   Future     Standing Expiration Date:   2023       Patient giveneducational materials - see patient instructions. Discussed use, benefit, and side effects of prescribed medications. All patient questions answered. Pt voiced understanding. Reviewed health maintenance. Patient agreedwith treatment plan. Follow up as directed. CT of sinuses ordered. Augmentin 500 3 times daily 10 days. Depo-Medrol 120 IM provided in office. Prednisone taper prescribed. Saline flush sample provided to patient.        Electronically signed by KOJO Erickson CNP on 2022 at 10:00 AM

## 2022-05-12 ENCOUNTER — OFFICE VISIT (OUTPATIENT)
Dept: FAMILY MEDICINE CLINIC | Age: 42
End: 2022-05-12
Payer: COMMERCIAL

## 2022-05-12 VITALS
DIASTOLIC BLOOD PRESSURE: 68 MMHG | RESPIRATION RATE: 16 BRPM | HEART RATE: 64 BPM | WEIGHT: 247 LBS | SYSTOLIC BLOOD PRESSURE: 108 MMHG | TEMPERATURE: 97.7 F | BODY MASS INDEX: 34.94 KG/M2

## 2022-05-12 DIAGNOSIS — E66.9 CLASS 2 OBESITY WITH BODY MASS INDEX (BMI) OF 38.0 TO 38.9 IN ADULT, UNSPECIFIED OBESITY TYPE, UNSPECIFIED WHETHER SERIOUS COMORBIDITY PRESENT: ICD-10-CM

## 2022-05-12 PROCEDURE — 99213 OFFICE O/P EST LOW 20 MIN: CPT | Performed by: FAMILY MEDICINE

## 2022-05-12 RX ORDER — PHENTERMINE HYDROCHLORIDE 37.5 MG/1
37.5 TABLET ORAL
Qty: 30 TABLET | Refills: 0 | Status: SHIPPED | OUTPATIENT
Start: 2022-05-12 | End: 2022-06-11

## 2022-05-12 ASSESSMENT — ENCOUNTER SYMPTOMS
DIARRHEA: 0
VOMITING: 0
RHINORRHEA: 0
SORE THROAT: 0
CONSTIPATION: 0
ABDOMINAL PAIN: 0
NAUSEA: 0
BACK PAIN: 0

## 2022-05-12 NOTE — PROGRESS NOTES
[address] [place] [facility] [address]  Dept: [phone]  Dept Fax: [phone]  Loc: [phone]  PROGRESS NOTE      VisitDate: 2022    Phyllis Schmidt is a 39 y.o. female who presents today for:     Chief Complaint   Patient presents with    1 Month Follow-Up     obesity. Weight is down 6 lbs. Seen  for sinusitis, still having nasal drainage, has CT tomorrow. Subjective:  HPI  Fu obesity. Weight down. No se. Doing well with med,    Review of Systems   Constitutional: Negative for chills, fatigue and fever. HENT: Negative for congestion, ear pain, postnasal drip, rhinorrhea and sore throat. Cardiovascular: Negative for chest pain, palpitations and leg swelling. Gastrointestinal: Negative for abdominal pain, constipation, diarrhea, nausea and vomiting. Genitourinary: Negative for dysuria, frequency and hematuria. Musculoskeletal: Negative for arthralgias, back pain and joint swelling. Skin: Negative for rash. Neurological: Negative for dizziness, light-headedness and headaches.      Past Medical History:   Diagnosis Date    Hypertension     Kidney stone     2016    PONV (postoperative nausea and vomiting)     Vitamin D deficiency       Past Surgical History:   Procedure Laterality Date    BONE MARROW BIOPSY  2017     SECTION      x2    CHOLECYSTECTOMY      CYSTOSCOPY  2016    Dr Rafael Salguero LITHOTRIPSY Left 2016    Dr Mosquera Cool HISTORY  2016    LEFT ESWL    TUBAL LIGATION      URETER STENT PLACEMENT Left 2016    Dr Cowan [place]     Family History   Problem Relation Age of Onset    Cancer Mother         breast, cervical    Heart Disease Mother     Diabetes Mother     Kidney Disease Father         dialysis    High Blood Pressure Father      Social History     Tobacco Use    Smoking status: Current Some Day Smoker     Types: Cigarettes, E-Cigarettes     Last attempt to quit: 2018     Years since quittin.3    Smokeless tobacco: Never Used    Tobacco comment: About a pack/week 25 yrs. Vapes socially   Substance Use Topics    Alcohol use: Yes     Comment: socially      Current Outpatient Medications   Medication Sig Dispense Refill    cetirizine (ZYRTEC) 10 MG tablet Take 10 mg by mouth daily      phentermine (ADIPEX-P) 37.5 MG tablet Take 1 tablet by mouth every morning (before breakfast) for 30 days. 30 tablet 0    ZINC PO Take 1 tablet by mouth daily      Ascorbic Acid (VITAMIN C PO) Take 1 tablet by mouth daily      lisinopril (PRINIVIL;ZESTRIL) 20 MG tablet Take 1 tablet by mouth daily 30 tablet 5    Cholecalciferol (VITAMIN D3) 125 MCG (5000 UT) TABS Take by mouth daily      fluticasone (FLONASE) 50 MCG/ACT nasal spray 1 spray by Each Nostril route daily       No current facility-administered medications for this visit. Allergies   Allergen Reactions    Codeine Nausea And Vomiting    Morphine Nausea And Vomiting     Health Maintenance   Topic Date Due    Varicella vaccine (1 of 2 - 2-dose childhood series) Never done    Pneumococcal 0-64 years Vaccine (1 - PCV) Never done    HIV screen  Never done    Hepatitis C screen  Never done    Cervical cancer screen  Never done    COVID-19 Vaccine (3 - Booster for Moderna series) 2022    Depression Screen  2023    Lipids  2027    DTaP/Tdap/Td vaccine (2 - Td or Tdap) 2030    Flu vaccine  Completed    Hepatitis A vaccine  Aged Out    Hepatitis B vaccine  Aged Out    Hib vaccine  Aged Out    Meningococcal (ACWY) vaccine  Aged Out         Objective:     Physical Exam  Constitutional:       General: She is not in acute distress. Appearance: She is well-developed. She is not diaphoretic. HENT:      Head: Normocephalic and atraumatic. Eyes:      General: No scleral icterus.      Conjunctiva/sclera: Conjunctivae normal.   Neck:      Thyroid: No thyromegaly. Vascular: No JVD. Comments: No bruits  Cardiovascular:      Rate and Rhythm: Normal rate and regular rhythm. Heart sounds: Normal heart sounds. Pulmonary:      Effort: Pulmonary effort is normal. No respiratory distress. Breath sounds: Normal breath sounds. No wheezing or rales. Abdominal:      Palpations: Abdomen is soft. There is no mass. Tenderness: There is no abdominal tenderness. There is no guarding. Musculoskeletal:         General: No tenderness. Skin:     General: Skin is warm and dry. Findings: No rash. Neurological:      Mental Status: She is alert and oriented to person, place, and time. Cranial Nerves: No cranial nerve deficit. /68   Pulse 64   Temp 97.7 °F (36.5 °C) (Oral)   Resp 16   Wt 247 lb (112 kg)   BMI 34.94 kg/m²       Impression/Plan:  1. Class 2 obesity with body mass index (BMI) of 38.0 to 38.9 in adult, unspecified obesity type, unspecified whether serious comorbidity present      Requested Prescriptions     Pending Prescriptions Disp Refills    phentermine (ADIPEX-P) 37.5 MG tablet 30 tablet 0     Sig: Take 1 tablet by mouth every morning (before breakfast) for 30 days. No orders of the defined types were placed in this encounter. Counseled on diet exercise for weight loss    Patient giveneducational materials - see patient instructions. Discussed use, benefit, and side effects of prescribed medications. All patient questions answered. Pt voiced understanding. Reviewed health maintenance. Patient agreedwith treatment plan. Follow up as directed. **This report has been created using voice recognition software. It may contain minor errorswhich are inherent in voice recognition technology. **       Electronically signed by Garrett Harrington MD on 5/12/2022 at 8:26 AM

## 2022-05-13 ENCOUNTER — TELEPHONE (OUTPATIENT)
Dept: FAMILY MEDICINE CLINIC | Age: 42
End: 2022-05-13

## 2022-05-13 ENCOUNTER — HOSPITAL ENCOUNTER (OUTPATIENT)
Dept: CT IMAGING | Age: 42
Discharge: HOME | End: 2022-05-13
Payer: COMMERCIAL

## 2022-05-13 DIAGNOSIS — J32.9 RECURRENT SINUSITIS: ICD-10-CM

## 2022-05-13 PROCEDURE — 70486 CT MAXILLOFACIAL W/O DYE: CPT

## 2022-05-13 NOTE — TELEPHONE ENCOUNTER
Treated with Augmentin and prednisone on 4-25-22. This was done today. Does she need any further treatment?

## 2022-05-13 NOTE — TELEPHONE ENCOUNTER
----- Message from KOJO Pinzon - CNP sent at 5/13/2022 10:37 AM EDT -----  CT of sinuses indicates mild mucosal inflammation of the maxillary sinuses otherwise CT within normal limits.   Mild leftward deviation of the nasal septum noted

## 2022-06-23 ENCOUNTER — HOSPITAL ENCOUNTER (OUTPATIENT)
Age: 42
Discharge: HOME | End: 2022-06-23
Payer: COMMERCIAL

## 2022-06-23 ENCOUNTER — HOSPITAL ENCOUNTER (OUTPATIENT)
Dept: GENERAL RADIOLOGY | Age: 42
Discharge: HOME | End: 2022-06-23
Payer: COMMERCIAL

## 2022-06-23 DIAGNOSIS — R31.29 MICROHEMATURIA: ICD-10-CM

## 2022-06-23 DIAGNOSIS — N20.0 STAGHORN CALCULUS: ICD-10-CM

## 2022-06-23 LAB
BACTERIA: ABNORMAL
BILIRUBIN URINE: NEGATIVE
BLOOD, URINE: ABNORMAL
CASTS: ABNORMAL /LPF
CASTS: ABNORMAL /LPF
CHARACTER, URINE: CLEAR
COLOR: YELLOW
CRYSTALS: ABNORMAL
EPITHELIAL CELLS, UA: ABNORMAL /HPF
GLUCOSE, URINE: NEGATIVE MG/DL
KETONES, URINE: NEGATIVE
LEUKOCYTE ESTERASE, URINE: NEGATIVE
MISCELLANEOUS LAB TEST RESULT: ABNORMAL
NITRITE, URINE: NEGATIVE
PH UA: 5.5 (ref 5–9)
PROTEIN UA: NEGATIVE MG/DL
RBC URINE: ABNORMAL /HPF
RENAL EPITHELIAL, UA: ABNORMAL
SPECIFIC GRAVITY UA: 1.02 (ref 1–1.03)
UROBILINOGEN, URINE: 0.2 EU/DL (ref 0–1)
WBC UA: ABNORMAL /HPF
YEAST: ABNORMAL

## 2022-06-23 PROCEDURE — 81001 URINALYSIS AUTO W/SCOPE: CPT

## 2022-06-23 PROCEDURE — 74018 RADEX ABDOMEN 1 VIEW: CPT

## 2022-06-27 RX ORDER — LISINOPRIL 20 MG/1
TABLET ORAL
Qty: 90 TABLET | Refills: 3 | Status: SHIPPED | OUTPATIENT
Start: 2022-06-27

## 2022-06-28 ENCOUNTER — OFFICE VISIT (OUTPATIENT)
Dept: UROLOGY | Age: 42
End: 2022-06-28
Payer: COMMERCIAL

## 2022-06-28 VITALS — BODY MASS INDEX: 34.56 KG/M2 | WEIGHT: 246.9 LBS | RESPIRATION RATE: 12 BRPM | HEIGHT: 71 IN

## 2022-06-28 DIAGNOSIS — N20.0 KIDNEY STONES: ICD-10-CM

## 2022-06-28 DIAGNOSIS — R31.29 MICROHEMATURIA: Primary | ICD-10-CM

## 2022-06-28 PROCEDURE — 99214 OFFICE O/P EST MOD 30 MIN: CPT | Performed by: UROLOGY

## 2022-06-28 NOTE — PROGRESS NOTES
[address] [place] [phone]  Dept: [phone]  Dept Fax: [phone] : [address] [place] [facility], [address] [place] [facility] Urology Office Note     Patient:  Jerry Arellano  YOB: 1980      The patient is a 39 y.o. female who presents today for evaluation of the following problems:   Chief Complaint   Patient presents with    Follow-up    Nephrolithiasis     kub prior     Hematuria     hx of micro-ua micro prior         HISTORY OF PRESENT ILLNESS:     Microhematuria-  no gross hematuria no smoking hx. Deferred previous workup    Kidney stones- reviewed films with patient. Pt has had multiple stone surgeries in past. Was told her stones were in locations not amenable to surgical removal in past. Moving and growing on KUB. Summary of Previous Records:   Hyun Nguyen is here for follow-up of kidney stones, right adrenal adenoma.     Patient previously seen by Corinna Drummond, and Dr. Negrito Ladd. Last seen 2 years ago. KUB shows left renal stones.     Reports intermittent lower back pain for last few months. Treated for UTI a few months ago. No symptoms today. No gross hematuria  + hx of tobacco use 21 years. CT in 2019 shows left hydronephrosis, follow up renal lasix scan showed no high grade obstruction.                  In the past she underwent left ESWL in June 2016 and again in July 2016 for large left renal stone. She passed several stone fragments following surgery.  She also has a right adrenal nodule which is consistent with adrenal adenoma.       Requested/reviewed records from Claudia Kuhn MD office and/or outside [de-identified]    (Patient's old records have been requested, reviewed and pertinent findings summarized in today's note.)    Procedures Today: N/A    Last several PSA's:  No results found for: PSA    Last total testosterone:  No results found for: TESTOSTERONE    Urinalysis today:  No results found for this visit on 06/28/22. Last BUN and creatinine:  Lab Results   Component Value Date    BUN 12 03/21/2022     Lab Results   Component Value Date    CREATININE 0.7 03/21/2022       Imaging Reviewed during this Office Visit:   imaging independently reviewed by Sandy Godwin MD and radiology report verified demonstrating     XR ABDOMEN (KUB) (SINGLE AP VIEW)    Result Date: 6/23/2022  PROCEDURE: XR ABDOMEN (KUB) (SINGLE AP VIEW) CLINICAL INFORMATION: Staghorn calculus. Follow-up. COMPARISON: Abdominal radiographs dated 12/9/2021. TECHNIQUE: A supine AP view of the abdomen and pelvis with 2 images was obtained. FINDINGS: There is scattered stool and gas throughout the large bowel which partially obscures the renal shadows. There are multiple circumscribed calcifications projecting over the inferior pole of the left renal shadow, similar to prior exam. An additional 7 mm calcification projects over the interpolar region of the left renal shadow. This has appeared in the interval. No other abnormal calcifications are identified projecting over the renal shadows or expected course of ureters. There is a stable calcified dictation projecting over the left hemipelvis likely representing a phlebolith. Left nephrolithiasis mildly worsened in the interval. **This report has been created using voice recognition software. It may contain minor errors which are inherent in voice recognition technology. ** Final report electronically signed by Dr. Jese Borges MD on 6/23/2022 9:43 AM        CT UROGRAM    Result Date: 6/3/2021  PROCEDURE: CT UROGRAM CLINICAL INFORMATION: Kidney stone, Microhematuria COMPARISON: 6/2/2016 TECHNIQUE: Axial CT images were obtained through the abdomen and pelvis without contrast. Postcontrast images were obtained through the abdomen with 2 minute delay and through the abdomen and pelvis with  8 minute delay. Coronal and sagittal reformatted images were rendered. All CT scans at this facility use dose modulation, iterative reconstruction, and/or weight-based dosing when appropriate to reduce radiation dose to as low as reasonably achievable. FINDINGS: Limited evaluation of the lung bases appear unremarkable. The liver, spleen, and pancreas appear normal. There is a low-attenuation 1.2 cm lesion within the right adrenal gland which likely represents an adrenal adenoma. Previously this measured 1 cm in transverse dimension on axial image 2016. The gallbladder surgically absent. There is a coarse staghorn calculus within the lower pole the left kidney measuring approximately 1.2 x 0.9 cm. This is nonobstructive. No hydronephrosis or hydroureter is seen. No ureterolithiasis is seen. The urinary bladder appears normal. There is no evidence of bowel obstruction. There is no lymphadenopathy. There is no free air or free fluid. No acute osseous findings are seen. There is degenerative disc disease at L5-S1 with disc space during, hypertrophic endplate change and endplate sclerosis. 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 1. Nonobstructive 1.2 x 0.9 cm staghorn calculus within the lower pole the left kidney. No hydronephrosis, hydroureter or ureterolithiasis is seen. **This report has been created using voice recognition software. It may contain minor errors which are inherent in voice recognition technology. ** Final report electronically signed by Dr. Rupert Luciano on 6/3/2021 10:12 AM      PAST MEDICAL, FAMILY AND SOCIAL HISTORY:  Past Medical History:   Diagnosis Date    Hypertension     Kidney stone     2016    PONV (postoperative nausea and vomiting)     Vitamin D deficiency      Past Surgical History:   Procedure Laterality Date    BONE MARROW BIOPSY  2017     SECTION      x2    CHOLECYSTECTOMY      CYSTOSCOPY  2016    Dr Jose Centeno LITHOTRIPSY Left 2016    Dr Christopher Ship[place]mont HISTORY  2016    LEFT ESWL    TUBAL LIGATION      URETER STENT PLACEMENT Left 2016    Dr Kellen Orosco     Family History   Problem Relation Age of Onset    Cancer Mother         breast, cervical    Heart Disease Mother     Diabetes Mother     Kidney Disease Father         dialysis    High Blood Pressure Father      Outpatient Medications Marked as Taking for the 22 encounter (Office Visit) with Favian Lyman MD   Medication Sig Dispense Refill    lisinopril (PRINIVIL;ZESTRIL) 20 MG tablet TAKE 1 TABLET BY MOUTH EVERY DAY 90 tablet 3    cetirizine (ZYRTEC) 10 MG tablet Take 10 mg by mouth daily      Cholecalciferol (VITAMIN D3) 125 MCG (5000 UT) TABS Take by mouth daily      fluticasone (FLONASE) 50 MCG/ACT nasal spray 1 spray by Each Nostril route daily         Codeine and Morphine  Social History     Tobacco Use   Smoking Status Current Some Day Smoker    Types: Cigarettes, E-Cigarettes    Last attempt to quit: 2018    Years since quittin.4   Smokeless Tobacco Never Used   Tobacco Comment    About a pack/week 25 yrs. Vapes socially      (If patient a smoker, smoking cessation counseling offered)   Social History     Substance and Sexual Activity   Alcohol Use Yes    Comment: socially       REVIEW OF SYSTEMS:  Constitutional: negative  Eyes: negative  Respiratory: negative  Cardiovascular: negative  Gastrointestinal: negative  Genitourinary: see HPI  Musculoskeletal: negative  Skin: negative   Neurological: negative  Hematological/Lymphatic: negative  Psychological: negative      Physical Exam:    This a 39 y.o. female  Vitals:    22 0958   Resp: 12     Body mass index is 34.93 kg/m². Constitutional: Patient in no acute distress;         Assessment and Plan        1. Microhematuria    2. Kidney stones               Plan:      Kidney stones- stable. reviewed kub. Israel Pastrana Discussed options including surgery, ESWL vs ureteroscopy. Pt elects to monitor with KUB. Microhematuria- stable. pt defer workup. Will follow.  Cystoscopies in past negative. Has had hematuria since 25years old with stones. KUB in three months        Prescriptions Ordered:  No orders of the defined types were placed in this encounter. Orders Placed:  No orders of the defined types were placed in this encounter.            Darlin Barker MD

## 2022-07-23 ENCOUNTER — HOSPITAL ENCOUNTER (EMERGENCY)
Age: 42
Discharge: HOME | End: 2022-07-23
Payer: COMMERCIAL

## 2022-07-23 VITALS
BODY MASS INDEX: 34.66 KG/M2 | HEART RATE: 68 BPM | SYSTOLIC BLOOD PRESSURE: 143 MMHG | DIASTOLIC BLOOD PRESSURE: 61 MMHG | WEIGHT: 245 LBS | OXYGEN SATURATION: 98 % | TEMPERATURE: 98.7 F | RESPIRATION RATE: 18 BRPM

## 2022-07-23 DIAGNOSIS — N30.01 ACUTE CYSTITIS WITH HEMATURIA: Primary | ICD-10-CM

## 2022-07-23 LAB
BILIRUBIN URINE: NEGATIVE
BLOOD, URINE: ABNORMAL
CHARACTER, URINE: CLEAR
COLOR: YELLOW
GLUCOSE URINE: NEGATIVE MG/DL
KETONES, URINE: NEGATIVE
LEUKOCYTE ESTERASE, URINE: ABNORMAL
NITRITE, URINE: NEGATIVE
PH UA: 6 (ref 5–9)
PROTEIN UA: 30 MG/DL
SPECIFIC GRAVITY UA: 1.02 (ref 1–1.03)
UROBILINOGEN, URINE: 0.2 EU/DL (ref 0.2–1)

## 2022-07-23 PROCEDURE — 87086 URINE CULTURE/COLONY COUNT: CPT

## 2022-07-23 PROCEDURE — 87077 CULTURE AEROBIC IDENTIFY: CPT

## 2022-07-23 PROCEDURE — 81003 URINALYSIS AUTO W/O SCOPE: CPT

## 2022-07-23 PROCEDURE — 99213 OFFICE O/P EST LOW 20 MIN: CPT

## 2022-07-23 PROCEDURE — 87186 SC STD MICRODIL/AGAR DIL: CPT

## 2022-07-23 RX ORDER — NITROFURANTOIN 25; 75 MG/1; MG/1
100 CAPSULE ORAL 2 TIMES DAILY
Qty: 10 CAPSULE | Refills: 0 | Status: SHIPPED | OUTPATIENT
Start: 2022-07-23 | End: 2022-07-28

## 2022-07-23 RX ORDER — PHENAZOPYRIDINE HYDROCHLORIDE 200 MG/1
200 TABLET, FILM COATED ORAL 3 TIMES DAILY PRN
Qty: 9 TABLET | Refills: 0 | Status: SHIPPED | OUTPATIENT
Start: 2022-07-23 | End: 2022-07-26

## 2022-07-23 ASSESSMENT — PAIN - FUNCTIONAL ASSESSMENT: PAIN_FUNCTIONAL_ASSESSMENT: NONE - DENIES PAIN

## 2022-07-23 ASSESSMENT — ENCOUNTER SYMPTOMS
ABDOMINAL PAIN: 0
COUGH: 0
SHORTNESS OF BREATH: 0

## 2022-07-23 NOTE — DISCHARGE INSTRUCTIONS
Macrobid as instructed until gone    Pyridium as instructed as needed    Drink plenty of fluids    Return for new or worsening symptoms

## 2022-07-23 NOTE — ED PROVIDER NOTES
[place]  [facility]gent [facility] Encounter       CHIEF COMPLAINT       Chief Complaint   Patient presents with    Dysuria       Nurses Notes reviewed and I agree except as noted in the HPI. HISTORY OF PRESENT ILLNESS   Jean Venegas is a 39 y.o. female who presents for dysuria, frequency and urgency. She feels some burning at the end of her stream.  Symptoms for 3 days. 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 She has pushed fluids but symptoms have not improved. Patient does have a history of kidney stones but denies any flank or back pain. No fevers, vomiting or abdominal pain. HPI    REVIEW OF SYSTEMS     Review of Systems   Constitutional:  Negative for fatigue and fever. Respiratory:  Negative for cough and shortness of breath. Gastrointestinal:  Negative for abdominal pain. Genitourinary:  Positive for dysuria, frequency and urgency. Negative for flank pain. Neurological:  Negative for dizziness and headaches. Psychiatric/Behavioral:  Negative for behavioral problems. PAST MEDICAL HISTORY         Diagnosis Date    Hypertension     Kidney stone     2016    PONV (postoperative nausea and vomiting)     Vitamin D deficiency        SURGICALHISTORY     Patient  has a past surgical history that includes Cholecystectomy;  section; Tubal ligation; Lithotripsy (Left, 2016); Ureter stent placement (Left, 2016); Cystoscopy (2016); other surgical history (2016); and bone marrow biopsy ().     CURRENT MEDICATIONS       Discharge Medication List as of 2022  9:52 AM        CONTINUE these medications which have NOT CHANGED    Details   lisinopril (PRINIVIL;ZESTRIL) 20 MG tablet TAKE 1 TABLET BY MOUTH EVERY DAY, Disp-90 tablet, R-3Normal      cetirizine (ZYRTEC) 10 MG tablet Take 10 mg by mouth dailyHistorical Med      Cholecalciferol (VITAMIN D3) 125 MCG (5000 UT) TABS Take by mouth dailyHistorical Med fluticasone (FLONASE) 50 MCG/ACT nasal spray 1 spray by Each Nostril route dailyHistorical Med             ALLERGIES     Patient is is allergic to codeine and morphine. Patients   Immunization History   Administered Date(s) Administered    COVID-19, MODERNA BLUE border, Primary or Immunocompromised, (age 12y+), IM, 100 mcg/0.5mL 10/24/2021, 11/21/2021    Influenza Virus Vaccine 11/10/2020    Influenza, Quadv, IM, (6 mo and older Fluzone, Flulaval, Fluarix and 3 yrs and older Afluria) 11/10/2020    Influenza, Quadv, IM, PF (6 mo and older Fluzone, Flulaval, Fluarix, and 3 yrs and older Afluria) 08/24/2017, 08/24/2017, 10/24/2021, 10/24/2021    Tdap (Boostrix, Adacel) 08/04/2020, 08/04/2020       FAMILY HISTORY     Patient's family history includes Cancer in her mother; Diabetes in her mother; Heart Disease in her mother; High Blood Pressure in her father; Kidney Disease in her father. SOCIAL HISTORY     Patient  reports that she has been smoking cigarettes and e-cigarettes. She has never used smokeless tobacco. She reports current alcohol use. She reports that she does not use drugs. PHYSICAL EXAM     ED TRIAGE VITALS  BP: (!) 143/61, Temp: 98.7 °F (37.1 °C), Heart Rate: 68, Resp: 18, SpO2: 98 %,Estimated body mass index is 34.66 kg/m² as calculated from the following:    Height as of 6/28/22: 5' 10.5\" (1.791 m). Weight as of this encounter: 245 lb (111.1 kg). ,Patient's last menstrual period was 07/16/2022. Physical Exam  Constitutional:       Appearance: She is normal weight. HENT:      Head: Normocephalic. Cardiovascular:      Rate and Rhythm: Normal rate and regular rhythm. Pulses: Normal pulses. Heart sounds: Normal heart sounds. Pulmonary:      Effort: Pulmonary effort is normal.      Breath sounds: Normal breath sounds. Abdominal:      General: Abdomen is flat. Bowel sounds are normal. There is no distension. Palpations: Abdomen is soft. Tenderness:  There is no abdominal tenderness. There is no right CVA tenderness or left CVA tenderness. Skin:     General: Skin is warm and dry. Neurological:      General: No focal deficit present. Mental Status: She is alert. Psychiatric:         Mood and Affect: Mood normal.       DIAGNOSTIC RESULTS     Labs:  Results for orders placed or performed during the hospital encounter of 07/23/22   Urinalysis   Result Value Ref Range    Glucose, Ur Negative NEGATIVE mg/dl    Bilirubin Urine Negative NEGATIVE    Ketones, Urine Negative NEGATIVE    Specific Gravity, UA 1.025 1.002 - 1.030    Blood, Urine Moderate (A) NEGATIVE    pH, UA 6.00 5.0 - 9.0    Protein, UA 30 (A) NEGATIVE mg/dl    Urobilinogen, Urine 0.20 0.2 - 1.0 eu/dl    Nitrite, Urine Negative NEGATIVE    Leukocyte Esterase, Urine Trace (A) NEGATIVE    Color, UA Yellow STRAW-YELLOW    Character, Urine Clear CLEAR-SL CLOUD       IMAGING:    No orders to display         EKG:      URGENT CARE COURSE:     Vitals:    07/23/22 0928 07/23/22 0930   BP: (!) 143/61    Pulse: 68    Resp: 18    Temp: 98.7 °F (37.1 °C)    TempSrc: Temporal    SpO2:  98%   Weight: 245 lb (111.1 kg)        Medications - No data to display         PROCEDURES:  None    FINAL IMPRESSION      1. Acute cystitis with hematuria          DISPOSITION/ PLAN   Patient presents for what is likely acute cystitis. We will treat with Pyridium. Urine culture in process. Pyridium for discomfort. Advised to drink plenty fluids. Tylenol as needed. Return if no improvement 2 to 3 days. Sooner if worse. PATIENT REFERRED TO:  Robert Nelson MD  [address] [place] [address] / [facility] [id number]      DISCHARGE MEDICATIONS:  Discharge Medication List as of 7/23/2022  9:52 AM        START taking these medications    Details   nitrofurantoin, macrocrystal-monohydrate, (MACROBID) 100 MG capsule Take 1 capsule by mouth in the morning and 1 capsule before bedtime. Do all this for 5 days. , Disp-10 capsule, R-0Normal phenazopyridine (PYRIDIUM) 200 MG tablet Take 1 tablet by mouth 3 times daily as needed for Pain, Disp-9 tablet, R-0Normal             Discharge Medication List as of 7/23/2022  9:52 AM          Discharge Medication List as of 7/23/2022  9:52 AM          KOJO Sanchez CNP    (Please note that portions of this note were completed with a voice recognition program. Efforts were made to edit the dictations but occasionally words are mis-transcribed.)          KOJO Sanchez CNP  07/23/22 1001

## 2022-07-23 NOTE — ED TRIAGE NOTES
Patient to room with c/o pain and frequency with urination beginning three days ago. Urine specimen obtained.

## 2022-07-25 LAB
ORGANISM: ABNORMAL
URINE CULTURE, ROUTINE: ABNORMAL

## 2023-03-16 ENCOUNTER — TELEPHONE (OUTPATIENT)
Dept: FAMILY MEDICINE CLINIC | Age: 43
End: 2023-03-16

## 2023-03-16 NOTE — TELEPHONE ENCOUNTER
She lives out of town and would like to transfer to a different family physician. I explained to her she will need to sign a release and we will get those records sent to her.

## 2023-03-16 NOTE — TELEPHONE ENCOUNTER
----- Message from Noman Wheelerdary sent at 3/16/2023  1:24 PM EDT -----  Subject: Message to Provider    QUESTIONS  Information for Provider? Pt calling as she needs a referral with notes to   Dr. Merlyn Hitchcock Fax # [phone] Needs notes about sinus issues that   she has been having since November. Please call if more information   needed. ---------------------------------------------------------------------------  --------------  Mariya Tsang [facility]  [phone]; OK to leave message on voicemail  ---------------------------------------------------------------------------  --------------  SCRIPT ANSWERS  Relationship to Patient?  Self